# Patient Record
Sex: FEMALE | Race: WHITE | Employment: OTHER | URBAN - METROPOLITAN AREA
[De-identification: names, ages, dates, MRNs, and addresses within clinical notes are randomized per-mention and may not be internally consistent; named-entity substitution may affect disease eponyms.]

---

## 2019-04-12 ENCOUNTER — OFFICE VISIT (OUTPATIENT)
Dept: FAMILY MEDICINE CLINIC | Age: 59
End: 2019-04-12
Payer: COMMERCIAL

## 2019-04-12 ENCOUNTER — HOSPITAL ENCOUNTER (OUTPATIENT)
Age: 59
Discharge: HOME | End: 2019-04-14
Payer: COMMERCIAL

## 2019-04-12 ENCOUNTER — TELEPHONE (OUTPATIENT)
Dept: FAMILY MEDICINE CLINIC | Age: 59
End: 2019-04-12

## 2019-04-12 VITALS
DIASTOLIC BLOOD PRESSURE: 62 MMHG | TEMPERATURE: 98.9 F | WEIGHT: 140 LBS | RESPIRATION RATE: 18 BRPM | OXYGEN SATURATION: 95 % | HEART RATE: 62 BPM | SYSTOLIC BLOOD PRESSURE: 94 MMHG | BODY MASS INDEX: 24.8 KG/M2 | HEIGHT: 63 IN

## 2019-04-12 DIAGNOSIS — Z79.891 CHRONIC PRESCRIPTION OPIATE USE: ICD-10-CM

## 2019-04-12 DIAGNOSIS — F51.01 PRIMARY INSOMNIA: ICD-10-CM

## 2019-04-12 DIAGNOSIS — M79.7 FIBROMYALGIA: Primary | ICD-10-CM

## 2019-04-12 DIAGNOSIS — J40 BRONCHITIS: ICD-10-CM

## 2019-04-12 DIAGNOSIS — M79.7 FIBROMYALGIA: ICD-10-CM

## 2019-04-12 DIAGNOSIS — F17.200 SMOKER: ICD-10-CM

## 2019-04-12 DIAGNOSIS — F41.9 ANXIETY: ICD-10-CM

## 2019-04-12 LAB
AMPHETAMINE SCREEN, URINE: NOT DETECTED
BARBITURATE SCREEN URINE: NOT DETECTED
BASOPHILS ABSOLUTE: 0.03 E9/L (ref 0–0.2)
BASOPHILS RELATIVE PERCENT: 0.3 % (ref 0–2)
BENZODIAZEPINE SCREEN, URINE: NOT DETECTED
CANNABINOID SCREEN URINE: NOT DETECTED
COCAINE METABOLITE SCREEN URINE: NOT DETECTED
EOSINOPHILS ABSOLUTE: 0.09 E9/L (ref 0.05–0.5)
EOSINOPHILS RELATIVE PERCENT: 0.9 % (ref 0–6)
HCT VFR BLD CALC: 42.5 % (ref 34–48)
HEMOGLOBIN: 13.6 G/DL (ref 11.5–15.5)
IMMATURE GRANULOCYTES #: 0.17 E9/L
IMMATURE GRANULOCYTES %: 1.7 % (ref 0–5)
LYMPHOCYTES ABSOLUTE: 2.52 E9/L (ref 1.5–4)
LYMPHOCYTES RELATIVE PERCENT: 25.3 % (ref 20–42)
MCH RBC QN AUTO: 31.6 PG (ref 26–35)
MCHC RBC AUTO-ENTMCNC: 32 % (ref 32–34.5)
MCV RBC AUTO: 98.6 FL (ref 80–99.9)
METHADONE SCREEN, URINE: NOT DETECTED
MONOCYTES ABSOLUTE: 0.7 E9/L (ref 0.1–0.95)
MONOCYTES RELATIVE PERCENT: 7 % (ref 2–12)
NEUTROPHILS ABSOLUTE: 6.45 E9/L (ref 1.8–7.3)
NEUTROPHILS RELATIVE PERCENT: 64.8 % (ref 43–80)
OPIATE SCREEN URINE: NOT DETECTED
PDW BLD-RTO: 13.2 FL (ref 11.5–15)
PHENCYCLIDINE SCREEN URINE: NOT DETECTED
PLATELET # BLD: 421 E9/L (ref 130–450)
PMV BLD AUTO: 9.1 FL (ref 7–12)
PROPOXYPHENE SCREEN: NOT DETECTED
RBC # BLD: 4.31 E12/L (ref 3.5–5.5)
WBC # BLD: 10 E9/L (ref 4.5–11.5)

## 2019-04-12 PROCEDURE — 3017F COLORECTAL CA SCREEN DOC REV: CPT | Performed by: PHYSICIAN ASSISTANT

## 2019-04-12 PROCEDURE — G8427 DOCREV CUR MEDS BY ELIG CLIN: HCPCS | Performed by: PHYSICIAN ASSISTANT

## 2019-04-12 PROCEDURE — 1036F TOBACCO NON-USER: CPT | Performed by: PHYSICIAN ASSISTANT

## 2019-04-12 PROCEDURE — 96372 THER/PROPH/DIAG INJ SC/IM: CPT | Performed by: PHYSICIAN ASSISTANT

## 2019-04-12 PROCEDURE — G8420 CALC BMI NORM PARAMETERS: HCPCS | Performed by: PHYSICIAN ASSISTANT

## 2019-04-12 PROCEDURE — 94640 AIRWAY INHALATION TREATMENT: CPT | Performed by: PHYSICIAN ASSISTANT

## 2019-04-12 PROCEDURE — 99204 OFFICE O/P NEW MOD 45 MIN: CPT | Performed by: PHYSICIAN ASSISTANT

## 2019-04-12 PROCEDURE — 80053 COMPREHEN METABOLIC PANEL: CPT

## 2019-04-12 PROCEDURE — 85025 COMPLETE CBC W/AUTO DIFF WBC: CPT

## 2019-04-12 PROCEDURE — G0480 DRUG TEST DEF 1-7 CLASSES: HCPCS

## 2019-04-12 PROCEDURE — 80307 DRUG TEST PRSMV CHEM ANLYZR: CPT

## 2019-04-12 RX ORDER — HYDROCODONE BITARTRATE AND ACETAMINOPHEN 7.5; 325 MG/1; MG/1
1 TABLET ORAL 3 TIMES DAILY
Refills: 0 | COMMUNITY
Start: 2019-03-12 | End: 2019-04-12 | Stop reason: SDUPTHER

## 2019-04-12 RX ORDER — ZOLPIDEM TARTRATE 10 MG/1
1 TABLET ORAL NIGHTLY
Refills: 0 | COMMUNITY
Start: 2019-04-04 | End: 2019-05-03 | Stop reason: SDUPTHER

## 2019-04-12 RX ORDER — DOXYCYCLINE HYCLATE 100 MG
100 TABLET ORAL 2 TIMES DAILY WITH MEALS
Qty: 20 TABLET | Refills: 0 | Status: SHIPPED | OUTPATIENT
Start: 2019-04-12 | End: 2019-04-12

## 2019-04-12 RX ORDER — ALBUTEROL SULFATE 90 UG/1
2 AEROSOL, METERED RESPIRATORY (INHALATION) EVERY 4 HOURS PRN
Qty: 1 INHALER | Refills: 0 | Status: SHIPPED
Start: 2019-04-12 | End: 2020-04-06 | Stop reason: SDUPTHER

## 2019-04-12 RX ORDER — HYDROCODONE BITARTRATE AND ACETAMINOPHEN 7.5; 325 MG/1; MG/1
1 TABLET ORAL 3 TIMES DAILY
Qty: 21 TABLET | Refills: 0 | Status: SHIPPED | OUTPATIENT
Start: 2019-04-12 | End: 2019-04-18 | Stop reason: SDUPTHER

## 2019-04-12 RX ORDER — ALBUTEROL SULFATE 2.5 MG/3ML
2.5 SOLUTION RESPIRATORY (INHALATION) ONCE
Status: COMPLETED | OUTPATIENT
Start: 2019-04-12 | End: 2019-04-12

## 2019-04-12 RX ORDER — CLONAZEPAM 0.5 MG/1
0.5 TABLET ORAL 2 TIMES DAILY
Qty: 14 TABLET | Refills: 0 | Status: SHIPPED | OUTPATIENT
Start: 2019-04-12 | End: 2019-04-18 | Stop reason: SDUPTHER

## 2019-04-12 RX ORDER — PREDNISONE 20 MG/1
20 TABLET ORAL 3 TIMES DAILY
Qty: 15 TABLET | Refills: 0 | Status: SHIPPED | OUTPATIENT
Start: 2019-04-12 | End: 2019-04-17

## 2019-04-12 RX ORDER — DEXAMETHASONE SODIUM PHOSPHATE 4 MG/ML
4 INJECTION, SOLUTION INTRA-ARTICULAR; INTRALESIONAL; INTRAMUSCULAR; INTRAVENOUS; SOFT TISSUE ONCE
Status: COMPLETED | OUTPATIENT
Start: 2019-04-12 | End: 2019-04-12

## 2019-04-12 RX ORDER — DOXYCYCLINE 100 MG/1
100 CAPSULE ORAL 2 TIMES DAILY
Qty: 20 CAPSULE | Refills: 0 | Status: SHIPPED | OUTPATIENT
Start: 2019-04-12 | End: 2019-08-20 | Stop reason: ALTCHOICE

## 2019-04-12 RX ORDER — CLONAZEPAM 0.5 MG/1
1 TABLET ORAL 2 TIMES DAILY
Refills: 0 | COMMUNITY
Start: 2019-03-03 | End: 2019-04-12 | Stop reason: SDUPTHER

## 2019-04-12 RX ORDER — BENZONATATE 100 MG/1
100 CAPSULE ORAL 2 TIMES DAILY PRN
Qty: 20 CAPSULE | Refills: 0 | Status: SHIPPED | OUTPATIENT
Start: 2019-04-12 | End: 2019-04-19

## 2019-04-12 RX ADMIN — DEXAMETHASONE SODIUM PHOSPHATE 4 MG: 4 INJECTION, SOLUTION INTRA-ARTICULAR; INTRALESIONAL; INTRAMUSCULAR; INTRAVENOUS; SOFT TISSUE at 12:41

## 2019-04-12 RX ADMIN — ALBUTEROL SULFATE 2.5 MG: 2.5 SOLUTION RESPIRATORY (INHALATION) at 12:00

## 2019-04-12 ASSESSMENT — PATIENT HEALTH QUESTIONNAIRE - PHQ9
1. LITTLE INTEREST OR PLEASURE IN DOING THINGS: 0
SUM OF ALL RESPONSES TO PHQ QUESTIONS 1-9: 0
2. FEELING DOWN, DEPRESSED OR HOPELESS: 0
SUM OF ALL RESPONSES TO PHQ9 QUESTIONS 1 & 2: 0
SUM OF ALL RESPONSES TO PHQ QUESTIONS 1-9: 0

## 2019-04-12 NOTE — PROGRESS NOTES
[facility]  [address]   [phone]      Estrella Yarbrough  1960 4/12/19      HPI:  Patient presents for cough and congestion. She was seen in an ED 1 week ago and given tamiflu and prednisone. She stopped the prednisone bc it make her nauseas and she didn't know what they were treating. She is a smoker. She is not sob. She has no fever. She has had fatigue. She has been on opiates for > 20 yrs for fibromyalgia and chronic neck pain. She recalls she has tried gabapentin, lyrica and cymbalta without results. She was sent to pain [facility] and was given morphine which she refused. She has seen neuro surg and refused surgical intervention. This all occurred in [place]. She is currently returning there monthly to get her rx filled. This time they were called in, but the Dr does not plan to do that again. She has been on the BZD in combination for years. She believes \"that's the one that really helps. \"     Past Medical History:   Diagnosis Date    Anxiety     Depression     Hyperlipidemia     not currently. Past Surgical History:   Procedure Laterality Date    TUBAL LIGATION  1989       Current Outpatient Medications   Medication Sig Dispense Refill    zolpidem (AMBIEN) 10 MG tablet Take 1 tablet by mouth nightly. 0    predniSONE (DELTASONE) 20 MG tablet Take 1 tablet by mouth 3 times daily for 5 days 15 tablet 0    benzonatate (TESSALON) 100 MG capsule Take 1 capsule by mouth 2 times daily as needed for Cough 20 capsule 0    albuterol sulfate HFA (VENTOLIN HFA) 108 (90 Base) MCG/ACT inhaler Inhale 2 puffs into the lungs every 4 hours as needed for Wheezing 1 Inhaler 0    clonazePAM (KLONOPIN) 0.5 MG tablet Take 1 tablet by mouth 2 times daily for 7 days. 14 tablet 0    HYDROcodone-acetaminophen (NORCO) 7.5-325 MG per tablet Take 1 tablet by mouth 3 times daily for 7 days.  21 tablet 0    doxycycline monohydrate (MONODOX) 100 MG capsule Take 1 capsule by mouth 2 times daily 20 capsule 0     No current facility-administered medications for this visit.         Allergies   Allergen Reactions    Darvon [Propoxyphene]      DARVOCET    Prozac [Fluoxetine]        Family History   Problem Relation Age of Onset   [facility] Cancer Mother     Dementia Mother     Alcohol Abuse Mother     Diabetes Father     Heart Attack Father     Other Sister         cerebral palsy    No Known Problems Brother     No Known Problems Half-Sister     No Known Problems Son     No Known Problems Son     No Known Problems Son     No Known Problems Son     No Known Problems Daughter        Social History     Socioeconomic History    Marital status:      Spouse name: Not on file    Number of children: Not on file    Years of education: Not on file    Highest education level: Not on file   Occupational History    Not on file   Social Needs    Financial resource strain: Not on file    Food insecurity:     Worry: Not on file     Inability: Not on file    Transportation needs:     Medical: Not on file     Non-medical: Not on file   Tobacco Use    Smoking status: Former Smoker     Last attempt to quit: 2019     Years since quittin.0    Smokeless tobacco: Never Used   Substance and Sexual Activity    Alcohol use: Not on file    Drug use: Not on file    Sexual activity: Not on file   Lifestyle    Physical activity:     Days per week: Not on file     Minutes per session: Not on file    Stress: Not on file   Relationships    Social connections:     Talks on phone: Not on file     Gets together: Not on file     Attends Spiritism service: Not on file     Active member of club or organization: Not on file     Attends meetings of clubs or organizations: Not on file     Relationship status: Not on file    Intimate partner violence:     Fear of current or ex partner: Not on file     Emotionally abused: Not on file     Physically abused: Not on file     Forced sexual activity: Not on file   Other Topics Concern    Not on file   Social History Narrative    Not on file       Review of Systems :  Constitutional: negative for - chills, fever, weight loss, or fatigue  HEENT: negative for - vision changes, nasal congestion, ear pain or pharyngitis   Respiratory: negative for -  Chest heaviness, cough, shortness of breath, or pleuritic pain  Cardiovascular: negative for - diaphoresis, chest pain, palpitations, or edema   Gastrointestinal: negative for -abdominal pain, change in bowel habits, constipation, diarrhea, or nausea/vomiting  Genito-Urinary: negative for - dysuria, frequency, or nocturia  Neurological: negative for - bowel and bladder control changes, dizziness, or headaches   Dermatological: negative for - dry skin, rash, hair or nail symptoms    Physical Exam:   Vitals:    04/12/19 1123   BP: 94/62   Site: Left Upper Arm   Position: Sitting   Cuff Size: Medium Adult   Pulse: 62   Resp: 18   Temp: 98.9 °F (37.2 °C)   TempSrc: Oral   SpO2: 95%   Weight: 140 lb (63.5 kg)   Height: 5' 3\" (1.6 m)     Physical Exam   Constitutional: She is oriented to person, place, and time. She appears well-developed and well-nourished. No distress. HENT:   Head: Normocephalic and atraumatic. Right Ear: External ear normal.   Left Ear: External ear normal.   Nose: Nose normal.   Mouth/Throat: Oropharynx is clear and moist.   Eyes: Pupils are equal, round, and reactive to light. Conjunctivae and EOM are normal. No scleral icterus. Neck: Normal range of motion. Neck supple. No thyromegaly present. Cardiovascular: Normal rate, regular rhythm, normal heart sounds and intact distal pulses. No murmur heard. Pulmonary/Chest: Effort normal. No accessory muscle usage. No respiratory distress. She has wheezes (throughout, not much improvement with neb). Musculoskeletal: Normal range of motion. Neurological: She is alert and oriented to person, place, and time. She has normal reflexes.    Skin: Skin is warm and dry. No rash noted. Psychiatric: She has a normal mood and affect. Her speech is normal and behavior is normal.         Assessment/Plan:     Sabine Martin was seen today for new patient, influenza and cough. Diagnoses and all orders for this visit:    Fibromyalgia  -     HYDROcodone-acetaminophen (NORCO) 7.5-325 MG per tablet; Take 1 tablet by mouth 3 times daily for 7 days.  -     COMPREHENSIVE METABOLIC PANEL; Future  -     CBC Auto Differential; Future    Anxiety  -     clonazePAM (KLONOPIN) 0.5 MG tablet; Take 1 tablet by mouth 2 times daily for 7 days. Bronchitis  -     dexamethasone (DECADRON) injection 4 mg    Primary insomnia    Chronic prescription opiate use  -     URINE DRUG SCREEN; Future  -     OPIATE, QUANTITATIVE, URINE; Future  -     Miscellaneous Sendout 1; Future    Smoker    Other orders  -     predniSONE (DELTASONE) 20 MG tablet; Take 1 tablet by mouth 3 times daily for 5 days  -     Discontinue: doxycycline hyclate (VIBRA-TABS) 100 MG tablet; Take 1 tablet by mouth 2 times daily (with meals) for 10 days  -     benzonatate (TESSALON) 100 MG capsule; Take 1 capsule by mouth 2 times daily as needed for Cough  -     albuterol sulfate HFA (VENTOLIN HFA) 108 (90 Base) MCG/ACT inhaler; Inhale 2 puffs into the lungs every 4 hours as needed for Wheezing  -     albuterol (PROVENTIL) nebulizer solution 2.5 mg  -     doxycycline monohydrate (MONODOX) 100 MG capsule; Take 1 capsule by mouth 2 times daily        Return in about 1 week (around 4/19/2019). I need records justifying chronic opiate use. She doesn't want to go to pain [facility] bc they are \"legal dope dealers\" per her . She has been on meds for 25+ years and will certainly withdrawal. I will send a UDS and review records and decide if we can manage this. Discussed the problems with BZD and opiates. Maybe she would benefit from Neuro Surg referral. She has refused spine surgery in the past. Discussed bronchitis and smoking cessation. Controlled Substances Monitoring:     RX Monitoring 4/12/2019   Attestation The Prescription Monitoring Report for this patient was reviewed today. Chronic Pain Routine Monitoring Random urine drug screen sent today. ;No signs of potential drug abuse or diversion identified: otherwise, see note documentation   Chronic Pain > 50 MEDD Considered consultation with a specialist.   Chronic Pain > 80 MEDD Looked for signs of prescription misuse.            DONIS Villegas

## 2019-04-12 NOTE — TELEPHONE ENCOUNTER
Pharmacist called stating insurance will not pay for Doxycycline Hyclate. Will cover [place] alternatively.  Please call in or send new RX

## 2019-04-13 LAB
ALBUMIN SERPL-MCNC: 4.3 G/DL (ref 3.5–5.2)
ALP BLD-CCNC: 47 U/L (ref 35–104)
ALT SERPL-CCNC: 19 U/L (ref 0–32)
ANION GAP SERPL CALCULATED.3IONS-SCNC: 13 MMOL/L (ref 7–16)
AST SERPL-CCNC: 21 U/L (ref 0–31)
BILIRUB SERPL-MCNC: 0.3 MG/DL (ref 0–1.2)
BUN BLDV-MCNC: 11 MG/DL (ref 6–20)
CALCIUM SERPL-MCNC: 9.6 MG/DL (ref 8.6–10.2)
CHLORIDE BLD-SCNC: 102 MMOL/L (ref 98–107)
CO2: 23 MMOL/L (ref 22–29)
CREAT SERPL-MCNC: 0.8 MG/DL (ref 0.5–1)
GFR AFRICAN AMERICAN: >60
GFR NON-AFRICAN AMERICAN: >60 ML/MIN/1.73
GLUCOSE BLD-MCNC: 74 MG/DL (ref 74–99)
POTASSIUM SERPL-SCNC: 4.8 MMOL/L (ref 3.5–5)
SODIUM BLD-SCNC: 138 MMOL/L (ref 132–146)
TOTAL PROTEIN: 7.6 G/DL (ref 6.4–8.3)

## 2019-04-17 LAB
7-AMINOCLONAZEPAM, URINE: 29 NG/ML
ALPHA-HYDROXYALPRAZOLAM, URINE: <5 NG/ML
ALPHA-HYDROXYMIDAZOLAM, URINE: <20 NG/ML
ALPRAZOLAM, URINE: <5 NG/ML
CHLORDIAZEPOXIDE, URINE: <20 NG/ML
CLONAZEPAM, URINE: <5 NG/ML
DIAZEPAM, URINE: <20 NG/ML
LORAZEPAM, URINE: <20 NG/ML
MIDAZOLAM, URINE: <20 NG/ML
NORDIAZEPAM, URINE: <20 NG/ML
OXAZEPAM, URINE: <20 NG/ML
TEMAZEPAM, URINE: <20 NG/ML

## 2019-04-18 ENCOUNTER — OFFICE VISIT (OUTPATIENT)
Dept: FAMILY MEDICINE CLINIC | Age: 59
End: 2019-04-18
Payer: COMMERCIAL

## 2019-04-18 VITALS
SYSTOLIC BLOOD PRESSURE: 112 MMHG | DIASTOLIC BLOOD PRESSURE: 76 MMHG | OXYGEN SATURATION: 99 % | TEMPERATURE: 97.8 F | BODY MASS INDEX: 25.34 KG/M2 | HEART RATE: 84 BPM | WEIGHT: 143 LBS | RESPIRATION RATE: 18 BRPM | HEIGHT: 63 IN

## 2019-04-18 DIAGNOSIS — Z79.891 CHRONIC PRESCRIPTION OPIATE USE: Primary | ICD-10-CM

## 2019-04-18 DIAGNOSIS — F41.9 ANXIETY: ICD-10-CM

## 2019-04-18 DIAGNOSIS — J40 BRONCHITIS: ICD-10-CM

## 2019-04-18 DIAGNOSIS — M79.7 FIBROMYALGIA: ICD-10-CM

## 2019-04-18 LAB
6AM URINE: <10 NG/ML
CODEINE, URINE: <20 NG/ML
HYDROCODONE, URINE: 308 NG/ML
HYDROMORPHONE, URINE: <20 NG/ML
MORPHINE URINE: <20 NG/ML
NORHYDROCODONE, URINE: 542 NG/ML
NOROXYCODONE, URINE: <20 NG/ML
NOROXYMORPHONE, URINE: <20 NG/ML
OXYCODONE, URINE CONFIRMATION: <20 NG/ML
OXYMORPHONE, URINE: <20 NG/ML

## 2019-04-18 PROCEDURE — 1036F TOBACCO NON-USER: CPT | Performed by: PHYSICIAN ASSISTANT

## 2019-04-18 PROCEDURE — 3017F COLORECTAL CA SCREEN DOC REV: CPT | Performed by: PHYSICIAN ASSISTANT

## 2019-04-18 PROCEDURE — 99214 OFFICE O/P EST MOD 30 MIN: CPT | Performed by: PHYSICIAN ASSISTANT

## 2019-04-18 PROCEDURE — G8427 DOCREV CUR MEDS BY ELIG CLIN: HCPCS | Performed by: PHYSICIAN ASSISTANT

## 2019-04-18 PROCEDURE — G8419 CALC BMI OUT NRM PARAM NOF/U: HCPCS | Performed by: PHYSICIAN ASSISTANT

## 2019-04-18 RX ORDER — HYDROCODONE BITARTRATE AND ACETAMINOPHEN 7.5; 325 MG/1; MG/1
1 TABLET ORAL 3 TIMES DAILY
Qty: 90 TABLET | Refills: 0 | Status: SHIPPED | OUTPATIENT
Start: 2019-04-18 | End: 2019-05-03 | Stop reason: SDUPTHER

## 2019-04-18 RX ORDER — CLONAZEPAM 0.5 MG/1
0.5 TABLET ORAL 2 TIMES DAILY
Qty: 60 TABLET | Refills: 0 | Status: SHIPPED | OUTPATIENT
Start: 2019-04-18 | End: 2019-05-03 | Stop reason: SDUPTHER

## 2019-04-18 NOTE — PROGRESS NOTES
[facility]  [address]   [phone]      Bonnita Nyhan  1960 4/18/19      HPI:  Patient presents for f/u for bronchitis. Her sx have improved dramatically. She completed the prednisone, and continues the Doxy. She is using the albuterol as directed. She states she is slightly \"jittery\" but assumed it is medication related. She brings in her record she has at home with her chronic dx from [facility] PCP. It states fibromyalgia and cervical disc disease. We still do not have a copy of the mri. I noticed she was given a rx for naloxone inj. She states it was actually given bc her daughter OD on heroin a year ago, therefore her PCP wanted her to have it. She states it was never filled bc she didn't want to have it. We reviewed her uds which was appropriate. Past Medical History:   Diagnosis Date    Anxiety     Depression     Hyperlipidemia     not currently. Past Surgical History:   Procedure Laterality Date    TUBAL LIGATION  1989       Current Outpatient Medications   Medication Sig Dispense Refill    clonazePAM (KLONOPIN) 0.5 MG tablet Take 1 tablet by mouth 2 times daily for 7 days. 60 tablet 0    HYDROcodone-acetaminophen (NORCO) 7.5-325 MG per tablet Take 1 tablet by mouth 3 times daily for 30 days. 90 tablet 0    zolpidem (AMBIEN) 10 MG tablet Take 1 tablet by mouth nightly. 0    benzonatate (TESSALON) 100 MG capsule Take 1 capsule by mouth 2 times daily as needed for Cough 20 capsule 0    albuterol sulfate HFA (VENTOLIN HFA) 108 (90 Base) MCG/ACT inhaler Inhale 2 puffs into the lungs every 4 hours as needed for Wheezing 1 Inhaler 0    doxycycline monohydrate (MONODOX) 100 MG capsule Take 1 capsule by mouth 2 times daily 20 capsule 0     No current facility-administered medications for this visit.         Allergies   Allergen Reactions    Darvon [Propoxyphene]      DARVOCET    Prozac [Fluoxetine]        Family History   Problem Relation Age of Onset    Cancer Mother     Dementia Mother     Alcohol Abuse Mother     Diabetes Father     Heart Attack Father     Other Sister         cerebral palsy    No Known Problems Brother     No Known Problems Half-Sister     No Known Problems Son     No Known Problems Son     No Known Problems Son     No Known Problems Son     No Known Problems Daughter        Social History     Socioeconomic History    Marital status:      Spouse name: Not on file    Number of children: Not on file    Years of education: Not on file    Highest education level: Not on file   Occupational History    Not on file   Social Needs    Financial resource strain: Not on file    Food insecurity:     Worry: Not on file     Inability: Not on file    Transportation needs:     Medical: Not on file     Non-medical: Not on file   Tobacco Use    Smoking status: Former Smoker     Last attempt to quit: 2019     Years since quittin.0    Smokeless tobacco: Never Used   Substance and Sexual Activity    Alcohol use: Not on file    Drug use: Not on file    Sexual activity: Not on file   Lifestyle    Physical activity:     Days per week: Not on file     Minutes per session: Not on file    Stress: Not on file   Relationships    Social connections:     Talks on phone: Not on file     Gets together: Not on file     Attends Islam service: Not on file     Active member of club or organization: Not on file     Attends meetings of clubs or organizations: Not on file     Relationship status: Not on file    Intimate partner violence:     Fear of current or ex partner: Not on file     Emotionally abused: Not on file     Physically abused: Not on file     Forced sexual activity: Not on file   Other Topics Concern    Not on file   Social History Narrative    Not on file       Review of Systems :  Constitutional: negative for - chills, fever, weight loss, or fatigue  Psychological: negative for - +anxiety, depression or visit:    Chronic prescription opiate use  -     ([facility]) - Demetris Resendez MD, NeurosurgeryErik ([place])    Fibromyalgia  -     HYDROcodone-acetaminophen (NORCO) 7.5-325 MG per tablet; Take 1 tablet by mouth 3 times daily for 30 days.  -     ([facility]) - Demetris eRsendez MD, Neurosurgery, Erik ([place]IL[place])    Bronchitis    Anxiety  -     clonazePAM (KLONOPIN) 0.5 MG tablet; Take 1 tablet by mouth 2 times daily for 7 days. Her uds was appropriate, as was her OArrs report. I understand that she has been on these meds chronically, but being that I have no medical records, and that I hesitate to continue this based on the dx of fibromyalgia, I will refer to pain mgnt for at least an opinion. Also I have concerns about the combination with the BZD. Patient will certainly withdraw without these meds, therefore I will provide an rx for 4 weeks, and will personally call Dr Farmer's office to express the urgency of the referral. Bronchitis is improved. She is not smoking. Controlled Substances Monitoring:     RX Monitoring 4/18/2019   Attestation The Prescription Monitoring Report for this patient was reviewed today. Acute Pain Prescriptions Severe pain not adequately treated with lower dose. Chronic Pain Routine Monitoring No signs of potential drug abuse or diversion identified: otherwise, see note documentation   Chronic Pain > 50 MEDD Considered consultation with a specialist.   Chronic Pain > 80 MEDD Looked for signs of prescription misuse. Chronic Pain > 120 MEDD Engaged a pain medicine specialist as a prescriber or consultant.          DONIS Yu

## 2019-07-02 ENCOUNTER — TELEPHONE (OUTPATIENT)
Dept: FAMILY MEDICINE CLINIC | Age: 59
End: 2019-07-02

## 2019-08-20 ENCOUNTER — OFFICE VISIT (OUTPATIENT)
Dept: FAMILY MEDICINE CLINIC | Age: 59
End: 2019-08-20
Payer: COMMERCIAL

## 2019-08-20 VITALS
RESPIRATION RATE: 18 BRPM | BODY MASS INDEX: 26.58 KG/M2 | WEIGHT: 150 LBS | OXYGEN SATURATION: 97 % | SYSTOLIC BLOOD PRESSURE: 104 MMHG | DIASTOLIC BLOOD PRESSURE: 60 MMHG | HEART RATE: 96 BPM | HEIGHT: 63 IN | TEMPERATURE: 98.9 F

## 2019-08-20 DIAGNOSIS — M79.7 FIBROMYALGIA: Primary | ICD-10-CM

## 2019-08-20 DIAGNOSIS — M25.551 RIGHT HIP PAIN: ICD-10-CM

## 2019-08-20 DIAGNOSIS — Z12.11 COLON CANCER SCREENING: ICD-10-CM

## 2019-08-20 DIAGNOSIS — Z12.39 BREAST CANCER SCREENING: ICD-10-CM

## 2019-08-20 PROCEDURE — 96372 THER/PROPH/DIAG INJ SC/IM: CPT | Performed by: PHYSICIAN ASSISTANT

## 2019-08-20 PROCEDURE — 3017F COLORECTAL CA SCREEN DOC REV: CPT | Performed by: PHYSICIAN ASSISTANT

## 2019-08-20 PROCEDURE — 99213 OFFICE O/P EST LOW 20 MIN: CPT | Performed by: PHYSICIAN ASSISTANT

## 2019-08-20 PROCEDURE — G8427 DOCREV CUR MEDS BY ELIG CLIN: HCPCS | Performed by: PHYSICIAN ASSISTANT

## 2019-08-20 PROCEDURE — G8419 CALC BMI OUT NRM PARAM NOF/U: HCPCS | Performed by: PHYSICIAN ASSISTANT

## 2019-08-20 PROCEDURE — 1036F TOBACCO NON-USER: CPT | Performed by: PHYSICIAN ASSISTANT

## 2019-08-20 RX ORDER — DEXAMETHASONE SODIUM PHOSPHATE 4 MG/ML
4 INJECTION, SOLUTION INTRA-ARTICULAR; INTRALESIONAL; INTRAMUSCULAR; INTRAVENOUS; SOFT TISSUE ONCE
Status: COMPLETED | OUTPATIENT
Start: 2019-08-20 | End: 2019-08-20

## 2019-08-20 RX ORDER — METHYLPREDNISOLONE 4 MG/1
TABLET ORAL
Qty: 1 KIT | Refills: 0 | Status: SHIPPED | OUTPATIENT
Start: 2019-08-20 | End: 2019-08-26

## 2019-08-20 RX ADMIN — DEXAMETHASONE SODIUM PHOSPHATE 4 MG: 4 INJECTION, SOLUTION INTRA-ARTICULAR; INTRALESIONAL; INTRAMUSCULAR; INTRAVENOUS; SOFT TISSUE at 12:34

## 2019-08-20 ASSESSMENT — PATIENT HEALTH QUESTIONNAIRE - PHQ9
SUM OF ALL RESPONSES TO PHQ9 QUESTIONS 1 & 2: 0
SUM OF ALL RESPONSES TO PHQ QUESTIONS 1-9: 0
1. LITTLE INTEREST OR PLEASURE IN DOING THINGS: 0
2. FEELING DOWN, DEPRESSED OR HOPELESS: 0
SUM OF ALL RESPONSES TO PHQ QUESTIONS 1-9: 0

## 2019-08-20 NOTE — PROGRESS NOTES
Relation Age of Onset    Cancer Mother     Dementia Mother     Alcohol Abuse Mother     Diabetes Father     Heart Attack Father     Other Sister         cerebral palsy    No Known Problems Brother     No Known Problems Half-Sister     No Known Problems Son     No Known Problems Son     No Known Problems Son     No Known Problems Son     No Known Problems Daughter        Social History     Socioeconomic History    Marital status:      Spouse name: Not on file    Number of children: Not on file    Years of education: Not on file    Highest education level: Not on file   Occupational History    Not on file   Social Needs    Financial resource strain: Not on file    Food insecurity:     Worry: Not on file     Inability: Not on file    Transportation needs:     Medical: Not on file     Non-medical: Not on file   Tobacco Use    Smoking status: Former Smoker     Packs/day: 0.50     Years: 35.00     Pack years: 17.50     Types: Cigarettes     Start date: 1975     Last attempt to quit: 2019     Years since quittin.3    Smokeless tobacco: Never Used   Substance and Sexual Activity    Alcohol use: Not on file    Drug use: Not on file    Sexual activity: Not on file   Lifestyle    Physical activity:     Days per week: Not on file     Minutes per session: Not on file    Stress: Not on file   Relationships    Social connections:     Talks on phone: Not on file     Gets together: Not on file     Attends Mormon service: Not on file     Active member of club or organization: Not on file     Attends meetings of clubs or organizations: Not on file     Relationship status: Not on file    Intimate partner violence:     Fear of current or ex partner: Not on file     Emotionally abused: Not on file     Physically abused: Not on file     Forced sexual activity: Not on file   Other Topics Concern    Not on file   Social History Narrative    Not on file       Review of Systems behavior is normal.       Assessment/Plan:     Gardner Leyden was seen today for hip pain. Diagnoses and all orders for this visit:    Fibromyalgia    Right hip pain  -     methylPREDNISolone (MEDROL, ADAM,) 4 MG tablet; Take as directed  -     dexamethasone (DECADRON) injection 4 mg    Breast cancer screening  -     LYDIA DIGITAL SCREEN W CAD BILATERAL; Future    Colon cancer screening  -     POCT Fit Test; Future        Return in about 4 weeks (around 9/17/2019). consider imaging if sx fail to resolve. Schedule pap and mammogram. Will try to get info from  about her colonoscopy.      DONIS Watkins

## 2019-10-14 ENCOUNTER — HOSPITAL ENCOUNTER (OUTPATIENT)
Age: 59
Discharge: HOME | End: 2019-10-16
Payer: COMMERCIAL

## 2019-10-14 ENCOUNTER — OFFICE VISIT (OUTPATIENT)
Dept: FAMILY MEDICINE CLINIC | Age: 59
End: 2019-10-14
Payer: COMMERCIAL

## 2019-10-14 VITALS
SYSTOLIC BLOOD PRESSURE: 105 MMHG | OXYGEN SATURATION: 95 % | TEMPERATURE: 99.3 F | HEIGHT: 64 IN | DIASTOLIC BLOOD PRESSURE: 69 MMHG | BODY MASS INDEX: 25.95 KG/M2 | HEART RATE: 75 BPM | WEIGHT: 152 LBS | RESPIRATION RATE: 16 BRPM

## 2019-10-14 DIAGNOSIS — Z12.11 COLON CANCER SCREENING: ICD-10-CM

## 2019-10-14 DIAGNOSIS — Z12.39 BREAST CANCER SCREENING: ICD-10-CM

## 2019-10-14 DIAGNOSIS — F17.200 SMOKER: ICD-10-CM

## 2019-10-14 DIAGNOSIS — Z23 NEED FOR STREPTOCOCCUS PNEUMONIAE AND INFLUENZA VACCINATION: ICD-10-CM

## 2019-10-14 DIAGNOSIS — Z12.4 CERVICAL CANCER SCREENING: Primary | ICD-10-CM

## 2019-10-14 PROCEDURE — G8482 FLU IMMUNIZE ORDER/ADMIN: HCPCS | Performed by: PHYSICIAN ASSISTANT

## 2019-10-14 PROCEDURE — 90472 IMMUNIZATION ADMIN EACH ADD: CPT | Performed by: PHYSICIAN ASSISTANT

## 2019-10-14 PROCEDURE — 90732 PPSV23 VACC 2 YRS+ SUBQ/IM: CPT | Performed by: PHYSICIAN ASSISTANT

## 2019-10-14 PROCEDURE — 90688 IIV4 VACCINE SPLT 0.5 ML IM: CPT | Performed by: PHYSICIAN ASSISTANT

## 2019-10-14 PROCEDURE — 88175 CYTOPATH C/V AUTO FLUID REDO: CPT

## 2019-10-14 PROCEDURE — 90471 IMMUNIZATION ADMIN: CPT | Performed by: PHYSICIAN ASSISTANT

## 2019-10-14 PROCEDURE — 99396 PREV VISIT EST AGE 40-64: CPT | Performed by: PHYSICIAN ASSISTANT

## 2019-10-14 RX ORDER — CLONAZEPAM 0.5 MG/1
0.5 TABLET ORAL 2 TIMES DAILY PRN
Refills: 0 | COMMUNITY
Start: 2019-09-16 | End: 2020-03-30 | Stop reason: SDUPTHER

## 2020-01-21 ENCOUNTER — OFFICE VISIT (OUTPATIENT)
Dept: FAMILY MEDICINE CLINIC | Age: 60
End: 2020-01-21
Payer: COMMERCIAL

## 2020-01-21 VITALS
HEART RATE: 73 BPM | TEMPERATURE: 98.3 F | WEIGHT: 155 LBS | OXYGEN SATURATION: 96 % | DIASTOLIC BLOOD PRESSURE: 62 MMHG | BODY MASS INDEX: 26.46 KG/M2 | HEIGHT: 64 IN | SYSTOLIC BLOOD PRESSURE: 98 MMHG | RESPIRATION RATE: 18 BRPM

## 2020-01-21 LAB
CONTROL: PRESENT
HEMOCCULT STL QL: NEGATIVE

## 2020-01-21 PROCEDURE — 82274 ASSAY TEST FOR BLOOD FECAL: CPT | Performed by: PHYSICIAN ASSISTANT

## 2020-01-21 PROCEDURE — 99213 OFFICE O/P EST LOW 20 MIN: CPT | Performed by: PHYSICIAN ASSISTANT

## 2020-01-21 ASSESSMENT — PATIENT HEALTH QUESTIONNAIRE - PHQ9
SUM OF ALL RESPONSES TO PHQ QUESTIONS 1-9: 0
SUM OF ALL RESPONSES TO PHQ QUESTIONS 1-9: 0
2. FEELING DOWN, DEPRESSED OR HOPELESS: 0
1. LITTLE INTEREST OR PLEASURE IN DOING THINGS: 0
SUM OF ALL RESPONSES TO PHQ9 QUESTIONS 1 & 2: 0

## 2020-01-21 NOTE — PROGRESS NOTES
[facility]  [address]   [phone]      Reshma Quintero  1960 1/21/20      HPI:  Patient presents for lesion on heel causing her pain when she walks. Sx present for 1 week. It is causing her to walk on her toes, thus causing leg pain. She also has a lesion on left arm, that continuously has scabbed and then was scrapped off for about 3 months. This is not painful. She has no similar lesions. Past Medical History:   Diagnosis Date    Anxiety     Depression     Hyperlipidemia     not currently. Past Surgical History:   Procedure Laterality Date    TUBAL LIGATION  1989       Current Outpatient Medications   Medication Sig Dispense Refill    mineral oil-hydrophilic petrolatum (AQUAPHOR) ointment Apply topically as needed. 1 g 0    Wound Dressings (ALLEVYN GENTLE BORDER HEEL) PADS Apply 1 Device topically daily 10 each 1    gabapentin (NEURONTIN) 400 MG capsule Take 1 capsule by mouth 4 times daily for 30 days. 120 capsule 2    [START ON 2/28/2020] HYDROcodone-acetaminophen (NORCO) 5-325 MG per tablet Take 1 tablet by mouth every 8 hours as needed for Pain for up to 30 days. Intended supply: 3 days. Take lowest dose possible to manage pain 90 tablet 0    clonazePAM (KLONOPIN) 0.5 MG tablet Take 0.5 mg by mouth 2 times daily as needed. 0    albuterol sulfate HFA (VENTOLIN HFA) 108 (90 Base) MCG/ACT inhaler Inhale 2 puffs into the lungs every 4 hours as needed for Wheezing 1 Inhaler 0    clonazePAM (KLONOPIN) 0.5 MG tablet Take 1 tablet by mouth 2 times daily for 7 days. 60 tablet 2     No current facility-administered medications for this visit.         Allergies   Allergen Reactions    Darvon [Propoxyphene]      DARVOCET    Prozac [Fluoxetine]        Family History   Problem Relation Age of Onset   [facility] Cancer Mother     Dementia Mother     Alcohol Abuse Mother     Diabetes Father     Heart Attack Father     Other Sister         cerebral changes, nasal congestion, ear pain or pharyngitis   Respiratory: negative for -  Chest heaviness, cough, shortness of breath, or pleuritic pain  Cardiovascular: negative for - diaphoresis, chest pain, palpitations, or edema   Gastrointestinal: negative for -abdominal pain, change in bowel habits, constipation, diarrhea, or nausea/vomiting  Genito-Urinary: negative for - dysuria, frequency, or nocturia  Musculoskeletal: negative for - gait disturbance, joint pain, muscle pain or weakness  Neurological: negative for - bowel and bladder control changes, dizziness, or headaches     Physical Exam:   Vitals:    01/21/20 1019   BP: 98/62   Site: Left Upper Arm   Position: Sitting   Cuff Size: Large Adult   Pulse: 73   Resp: 18   Temp: 98.3 °F (36.8 °C)   TempSrc: Oral   SpO2: 96%   Weight: 155 lb (70.3 kg)   Height: 5' 4\" (1.626 m)     Physical Exam  Constitutional:       General: She is not in acute distress. Appearance: She is well-developed. HENT:      Head: Normocephalic and atraumatic. Right Ear: External ear normal.      Left Ear: External ear normal.      Nose: Nose normal.   Eyes:      General: No scleral icterus. Conjunctiva/sclera: Conjunctivae normal.      Pupils: Pupils are equal, round, and reactive to light. Neck:      Musculoskeletal: Normal range of motion and neck supple. Thyroid: No thyromegaly. Cardiovascular:      Rate and Rhythm: Normal rate and regular rhythm. Heart sounds: Normal heart sounds. No murmur. Pulmonary:      Effort: Pulmonary effort is normal. No accessory muscle usage or respiratory distress. Breath sounds: Normal breath sounds. No wheezing. Musculoskeletal: Normal range of motion. Skin:     General: Skin is warm and dry. Findings: Lesion (post forearm scabbed lesion) present. No rash. Comments: Callous of heel   Neurological:      Mental Status: She is alert and oriented to person, place, and time.       Deep Tendon Reflexes: Reflexes are normal and symmetric. Psychiatric:         Speech: Speech normal.         Behavior: Behavior normal.           Assessment/Plan:     Minh Alfaro was seen today for foot pain. Diagnoses and all orders for this visit:    Callus of heel    Lesion of left median nerve at upper arm    Encounter for screening mammogram for breast cancer  -     [facility] Digital Screen Bilateral [[id number]]; Future    Colon cancer screening  -     Cancel: Cologuard (For External Results Only); Future  -     POCT Fit Test; Future    Other orders  -     mineral oil-hydrophilic petrolatum (AQUAPHOR) ointment; Apply topically as needed. -     Wound Dressings (ALLEVYN GENTLE BORDER HEEL) PADS; Apply 1 Device topically daily        F/u in 2 week if lesion on arm does not heal. Will need shave biopsy.      DONIS Briones

## 2020-01-21 NOTE — TELEPHONE ENCOUNTER
Pharmacy called stated that the gentle border heel is not something they covered and or that they couldn't order something like this for the patient please advise

## 2020-03-04 ENCOUNTER — OFFICE VISIT (OUTPATIENT)
Dept: FAMILY MEDICINE CLINIC | Age: 60
End: 2020-03-04
Payer: COMMERCIAL

## 2020-03-04 ENCOUNTER — TELEPHONE (OUTPATIENT)
Dept: ADMINISTRATIVE | Age: 60
End: 2020-03-04

## 2020-03-04 ENCOUNTER — NURSE TRIAGE (OUTPATIENT)
Dept: OTHER | Facility: CLINIC | Age: 60
End: 2020-03-04

## 2020-03-04 VITALS
HEIGHT: 63 IN | OXYGEN SATURATION: 97 % | TEMPERATURE: 97.6 F | BODY MASS INDEX: 27.46 KG/M2 | SYSTOLIC BLOOD PRESSURE: 122 MMHG | DIASTOLIC BLOOD PRESSURE: 80 MMHG | RESPIRATION RATE: 18 BRPM | HEART RATE: 65 BPM | WEIGHT: 155 LBS

## 2020-03-04 PROCEDURE — 90715 TDAP VACCINE 7 YRS/> IM: CPT | Performed by: PHYSICIAN ASSISTANT

## 2020-03-04 PROCEDURE — 90471 IMMUNIZATION ADMIN: CPT | Performed by: PHYSICIAN ASSISTANT

## 2020-03-04 PROCEDURE — S8451 SPLINT WRIST OR ANKLE: HCPCS | Performed by: PHYSICIAN ASSISTANT

## 2020-03-04 PROCEDURE — 99214 OFFICE O/P EST MOD 30 MIN: CPT | Performed by: PHYSICIAN ASSISTANT

## 2020-03-04 SDOH — HEALTH STABILITY: MENTAL HEALTH: HOW OFTEN DO YOU HAVE A DRINK CONTAINING ALCOHOL?: NEVER

## 2020-03-04 NOTE — PROGRESS NOTES
3/4/20  Tavon Burciaga : 1960 Sex: female  Age 61 y.o. Subjective:  Chief Complaint   Patient presents with    Fall     pt states she fell last night down about 8 steps, right hand pain, left back pain          HPI:   Tavon Burciaga , 61 y.o. female presents to [facility] care for evaluation of fell down steps last night. The patient states that she slipped and fell down about 8 steps last night. The patient landed mostly on her right side. The patient is complaining of right wrist pain. The patient is also having some left low back pain. The patient also has a small superficial abrasion noted to her anterior left knee. The patient is also having some right mid thigh pain. The patient denies head injury. No loss of consciousness. The patient does not take any anticoagulants. No history of bleeding disorders. The patient did not have any bladder or bowel incontinence, urinary retention or saddle anesthesia. She is able to ambulate. ROS:   Unless otherwise stated in this report the patient's positive and negative responses for review of systems for constitutional, eyes, ENT, cardiovascular, respiratory, gastrointestinal, neurological, , musculoskeletal, and integument systems and related systems to the presenting problem are either stated in the history of present illness or were not pertinent or were negative for the symptoms and/or complaints related to the presenting medical problem. Positives and pertinent negatives as per HPI. All others reviewed and are negative. PMH:     Past Medical History:   Diagnosis Date    Anxiety     Depression     Hyperlipidemia     not currently.        Past Surgical History:   Procedure Laterality Date    TUBAL LIGATION         Family History   Problem Relation Age of Onset   Skylar Addison Cancer Mother     Dementia Mother     Alcohol Abuse Mother     Diabetes Father     Heart Attack Father     Other Sister         cerebral palsy    No Known radiopaque foreign body noted. No acute findings. Medical Decision Making:     The patient does not appear to be toxic or lethargic. The patient does appear well. Vital signs reviewed and noted to be within normal limits. The patient will be sent for x-rays of the right wrist, the left knee, the right femur, and the lumbar spine. Patient will be reassessed and reevaluated after the imaging. She will also require a Tdap and we will provide that here for her. The wound to the left knee will be cleaned and irrigated as well. The patient had bacitracin applied and a nonadherent dressing. The patient was neurovascular intact. I reviewed the x-rays of the left knee and did not see any evidence of acute fracture dislocation. I reviewed the x-ray of the right femur and did not see any evidence of acute fracture or dislocation. I personally reviewed the x-rays of the lumbar spine did show multiple degenerative changes but no evidence of acute process. I personally reviewed the images of the right wrist and did not see any evidence of acute fracture or dislocation. The patient will be placed in a Velcro wrist splint. We will have the patient follow-up with PCP. The patient is neurovascular intact. The patient does not appear to be toxic or lethargic. The patient does appear well. The patient will follow-up with PCP. We discussed the potential of an occult fracture and the need for follow-up. The patient will use Motrin, Tylenol, ice to the affected areas. Clinical Impression:   Pedro Tyler was seen today for fall. Diagnoses and all orders for this visit:    Fall, initial encounter  -     XR WRIST RIGHT (MIN 3 VIEWS); Future  -     XR LUMBAR SPINE (2-3 VIEWS); Future  -     Cancel: XR KNEE LEFT (MIN 4 VIEWS); Future  -     XR FEMUR RIGHT (MIN 2 VIEWS);  Future    Right wrist pain    Acute left-sided low back pain without sciatica    Acute pain of left knee    Abrasion, left

## 2020-03-04 NOTE — TELEPHONE ENCOUNTER
Reason for Disposition   Patient wants to be seen    Protocols used: HAND AND WRIST INJURY-ADULT-[place]    PT states she tripped up the steps then fell backwards down the steps. States she fell approximately 6 steps. She states she has a small cut on her left leg, a brusie on her right wrist and her back has a scratch on it. Pt given disposition recommendation and soft transferred to scheduling.

## 2020-03-04 NOTE — TELEPHONE ENCOUNTER
No appt avail pt  Advised to go to walk in care n. [id number] [facility].  Office at lunch , pcp off, Dr Bela Ivory full

## 2020-04-06 RX ORDER — ALBUTEROL SULFATE 90 UG/1
2 AEROSOL, METERED RESPIRATORY (INHALATION) EVERY 4 HOURS PRN
Qty: 1 INHALER | Refills: 0 | Status: SHIPPED | OUTPATIENT
Start: 2020-04-06

## 2020-10-16 ENCOUNTER — NURSE ONLY (OUTPATIENT)
Dept: FAMILY MEDICINE CLINIC | Age: 60
End: 2020-10-16

## 2020-10-16 PROCEDURE — 90471 IMMUNIZATION ADMIN: CPT | Performed by: PHYSICIAN ASSISTANT

## 2020-10-16 PROCEDURE — 90686 IIV4 VACC NO PRSV 0.5 ML IM: CPT | Performed by: PHYSICIAN ASSISTANT

## 2021-03-12 ENCOUNTER — IMMUNIZATION (OUTPATIENT)
Dept: PRIMARY CARE CLINIC | Age: 61
End: 2021-03-12
Payer: COMMERCIAL

## 2021-03-12 PROCEDURE — 91300 COVID-19, PFIZER VACCINE 30MCG/0.3ML DOSE: CPT | Performed by: PHYSICIAN ASSISTANT

## 2021-03-12 PROCEDURE — 0001A COVID-19, PFIZER VACCINE 30MCG/0.3ML DOSE: CPT | Performed by: PHYSICIAN ASSISTANT

## 2021-04-14 ENCOUNTER — IMMUNIZATION (OUTPATIENT)
Dept: PRIMARY CARE CLINIC | Age: 61
End: 2021-04-14
Payer: COMMERCIAL

## 2021-04-14 PROCEDURE — 0002A COVID-19, PFIZER VACCINE 30MCG/0.3ML DOSE: CPT

## 2021-04-14 PROCEDURE — 91300 COVID-19, PFIZER VACCINE 30MCG/0.3ML DOSE: CPT

## 2021-06-28 ENCOUNTER — OFFICE VISIT (OUTPATIENT)
Dept: FAMILY MEDICINE CLINIC | Age: 61
End: 2021-06-28
Payer: COMMERCIAL

## 2021-06-28 VITALS
DIASTOLIC BLOOD PRESSURE: 84 MMHG | TEMPERATURE: 97.4 F | WEIGHT: 155 LBS | BODY MASS INDEX: 27.46 KG/M2 | RESPIRATION RATE: 14 BRPM | HEART RATE: 78 BPM | OXYGEN SATURATION: 96 % | SYSTOLIC BLOOD PRESSURE: 130 MMHG | HEIGHT: 63 IN

## 2021-06-28 DIAGNOSIS — F41.9 ANXIETY: ICD-10-CM

## 2021-06-28 DIAGNOSIS — F17.200 SMOKER: ICD-10-CM

## 2021-06-28 DIAGNOSIS — Z12.31 ENCOUNTER FOR SCREENING MAMMOGRAM FOR MALIGNANT NEOPLASM OF BREAST: ICD-10-CM

## 2021-06-28 DIAGNOSIS — Z12.11 COLON CANCER SCREENING: ICD-10-CM

## 2021-06-28 DIAGNOSIS — M25.512 ACUTE PAIN OF LEFT SHOULDER: Primary | ICD-10-CM

## 2021-06-28 DIAGNOSIS — Z79.891 CHRONIC PRESCRIPTION OPIATE USE: ICD-10-CM

## 2021-06-28 DIAGNOSIS — Z13.220 LIPID SCREENING: ICD-10-CM

## 2021-06-28 DIAGNOSIS — M79.7 FIBROMYALGIA: ICD-10-CM

## 2021-06-28 LAB
BASOPHILS ABSOLUTE: 0.06 E9/L (ref 0–0.2)
BASOPHILS RELATIVE PERCENT: 0.9 % (ref 0–2)
EOSINOPHILS ABSOLUTE: 0.1 E9/L (ref 0.05–0.5)
EOSINOPHILS RELATIVE PERCENT: 1.6 % (ref 0–6)
HCT VFR BLD CALC: 41.7 % (ref 34–48)
HEMOGLOBIN: 13.3 G/DL (ref 11.5–15.5)
IMMATURE GRANULOCYTES #: 0.02 E9/L
IMMATURE GRANULOCYTES %: 0.3 % (ref 0–5)
LYMPHOCYTES ABSOLUTE: 2.15 E9/L (ref 1.5–4)
LYMPHOCYTES RELATIVE PERCENT: 33.5 % (ref 20–42)
MCH RBC QN AUTO: 31.1 PG (ref 26–35)
MCHC RBC AUTO-ENTMCNC: 31.9 % (ref 32–34.5)
MCV RBC AUTO: 97.7 FL (ref 80–99.9)
MONOCYTES ABSOLUTE: 0.46 E9/L (ref 0.1–0.95)
MONOCYTES RELATIVE PERCENT: 7.2 % (ref 2–12)
NEUTROPHILS ABSOLUTE: 3.62 E9/L (ref 1.8–7.3)
NEUTROPHILS RELATIVE PERCENT: 56.5 % (ref 43–80)
PDW BLD-RTO: 13.2 FL (ref 11.5–15)
PLATELET # BLD: 257 E9/L (ref 130–450)
PMV BLD AUTO: 8.8 FL (ref 7–12)
RBC # BLD: 4.27 E12/L (ref 3.5–5.5)
WBC # BLD: 6.4 E9/L (ref 4.5–11.5)

## 2021-06-28 PROCEDURE — 96372 THER/PROPH/DIAG INJ SC/IM: CPT | Performed by: PHYSICIAN ASSISTANT

## 2021-06-28 PROCEDURE — G8419 CALC BMI OUT NRM PARAM NOF/U: HCPCS | Performed by: PHYSICIAN ASSISTANT

## 2021-06-28 PROCEDURE — 99214 OFFICE O/P EST MOD 30 MIN: CPT | Performed by: PHYSICIAN ASSISTANT

## 2021-06-28 PROCEDURE — G8427 DOCREV CUR MEDS BY ELIG CLIN: HCPCS | Performed by: PHYSICIAN ASSISTANT

## 2021-06-28 PROCEDURE — 4004F PT TOBACCO SCREEN RCVD TLK: CPT | Performed by: PHYSICIAN ASSISTANT

## 2021-06-28 PROCEDURE — 3017F COLORECTAL CA SCREEN DOC REV: CPT | Performed by: PHYSICIAN ASSISTANT

## 2021-06-28 RX ORDER — DEXAMETHASONE SODIUM PHOSPHATE 4 MG/ML
4 INJECTION, SOLUTION INTRA-ARTICULAR; INTRALESIONAL; INTRAMUSCULAR; INTRAVENOUS; SOFT TISSUE ONCE
Status: COMPLETED | OUTPATIENT
Start: 2021-06-28 | End: 2021-06-28

## 2021-06-28 RX ADMIN — DEXAMETHASONE SODIUM PHOSPHATE 4 MG: 4 INJECTION, SOLUTION INTRA-ARTICULAR; INTRALESIONAL; INTRAMUSCULAR; INTRAVENOUS; SOFT TISSUE at 15:52

## 2021-06-28 SDOH — ECONOMIC STABILITY: FOOD INSECURITY: WITHIN THE PAST 12 MONTHS, THE FOOD YOU BOUGHT JUST DIDN'T LAST AND YOU DIDN'T HAVE MONEY TO GET MORE.: PATIENT DECLINED

## 2021-06-28 SDOH — ECONOMIC STABILITY: FOOD INSECURITY: WITHIN THE PAST 12 MONTHS, YOU WORRIED THAT YOUR FOOD WOULD RUN OUT BEFORE YOU GOT MONEY TO BUY MORE.: PATIENT DECLINED

## 2021-06-28 ASSESSMENT — PATIENT HEALTH QUESTIONNAIRE - PHQ9
SUM OF ALL RESPONSES TO PHQ QUESTIONS 1-9: 0
SUM OF ALL RESPONSES TO PHQ9 QUESTIONS 1 & 2: 0
SUM OF ALL RESPONSES TO PHQ QUESTIONS 1-9: 0
SUM OF ALL RESPONSES TO PHQ QUESTIONS 1-9: 0
1. LITTLE INTEREST OR PLEASURE IN DOING THINGS: 0
2. FEELING DOWN, DEPRESSED OR HOPELESS: 0

## 2021-06-28 ASSESSMENT — SOCIAL DETERMINANTS OF HEALTH (SDOH): HOW HARD IS IT FOR YOU TO PAY FOR THE VERY BASICS LIKE FOOD, HOUSING, MEDICAL CARE, AND HEATING?: PATIENT DECLINED

## 2021-06-28 NOTE — PROGRESS NOTES
possible side effects, risks, benefits and alternatives to treatment; patient and/or guardian verbalizes understanding, agrees, feels comfortable with and wishes to proceed with above treatment plan. Advised patient to call with any new medication issues, and read all Rx info from pharmacy to assure aware of all possible risks and side effects of medication before taking. Reviewed age and gender appropriate health screening exams and vaccinations. Advised patient regarding importance of keeping up with recommended health maintenance and to schedule as soon as possible if overdue, as this is important in assessing for undiagnosed pathology, especially cancer, as well as protecting against potentially harmful/life threatening disease. Patient and/or guardian verbalizes understanding and agrees with above counseling, assessment and plan. All questions answered. Harley Santacruz PA-C  6/28/2021    I have personally reviewed and updated the chief complaint, HPI, Past Medical, Family and Social History, as well as the above Review of Systems.

## 2021-06-29 LAB
ALBUMIN SERPL-MCNC: 4.3 G/DL (ref 3.5–5.2)
ALP BLD-CCNC: 55 U/L (ref 35–104)
ALT SERPL-CCNC: 15 U/L (ref 0–32)
ANION GAP SERPL CALCULATED.3IONS-SCNC: 10 MMOL/L (ref 7–16)
AST SERPL-CCNC: 21 U/L (ref 0–31)
BILIRUB SERPL-MCNC: 0.3 MG/DL (ref 0–1.2)
BUN BLDV-MCNC: 11 MG/DL (ref 6–23)
CALCIUM SERPL-MCNC: 9.6 MG/DL (ref 8.6–10.2)
CHLORIDE BLD-SCNC: 105 MMOL/L (ref 98–107)
CHOLESTEROL, TOTAL: 243 MG/DL (ref 0–199)
CO2: 24 MMOL/L (ref 22–29)
CREAT SERPL-MCNC: 0.8 MG/DL (ref 0.5–1)
GFR AFRICAN AMERICAN: >60
GFR NON-AFRICAN AMERICAN: >60 ML/MIN/1.73
GLUCOSE BLD-MCNC: 100 MG/DL (ref 74–99)
HDLC SERPL-MCNC: 46 MG/DL
LDL CHOLESTEROL CALCULATED: 155 MG/DL (ref 0–99)
POTASSIUM SERPL-SCNC: 4.5 MMOL/L (ref 3.5–5)
SODIUM BLD-SCNC: 139 MMOL/L (ref 132–146)
TOTAL PROTEIN: 7.1 G/DL (ref 6.4–8.3)
TRIGL SERPL-MCNC: 211 MG/DL (ref 0–149)
VLDLC SERPL CALC-MCNC: 42 MG/DL

## 2021-07-08 DIAGNOSIS — Z12.11 COLON CANCER SCREENING: ICD-10-CM

## 2022-07-12 ENCOUNTER — TELEPHONE (OUTPATIENT)
Dept: PRIMARY CARE CLINIC | Age: 62
End: 2022-07-12

## 2022-07-12 NOTE — TELEPHONE ENCOUNTER
PT POSITIVE COVID AT HOME TEST DONE, PT WANTS MEDICATION,, [place] PT ADVISED TO GO TO [facility] FOR TREATMENT

## 2022-07-22 ENCOUNTER — OFFICE VISIT (OUTPATIENT)
Dept: PAIN MANAGEMENT | Age: 62
End: 2022-07-22
Payer: COMMERCIAL

## 2022-07-22 VITALS
RESPIRATION RATE: 16 BRPM | WEIGHT: 134 LBS | HEART RATE: 70 BPM | HEIGHT: 63 IN | OXYGEN SATURATION: 96 % | DIASTOLIC BLOOD PRESSURE: 75 MMHG | BODY MASS INDEX: 23.74 KG/M2 | TEMPERATURE: 97.3 F | SYSTOLIC BLOOD PRESSURE: 122 MMHG

## 2022-07-22 DIAGNOSIS — G89.4 CHRONIC PAIN SYNDROME: Primary | ICD-10-CM

## 2022-07-22 DIAGNOSIS — M79.7 FIBROMYALGIA: ICD-10-CM

## 2022-07-22 DIAGNOSIS — G89.29 CHRONIC BILATERAL LOW BACK PAIN WITHOUT SCIATICA: ICD-10-CM

## 2022-07-22 DIAGNOSIS — M54.50 CHRONIC BILATERAL LOW BACK PAIN WITHOUT SCIATICA: ICD-10-CM

## 2022-07-22 DIAGNOSIS — M54.2 CERVICALGIA: ICD-10-CM

## 2022-07-22 PROCEDURE — 99204 OFFICE O/P NEW MOD 45 MIN: CPT | Performed by: PAIN MEDICINE

## 2022-07-22 PROCEDURE — G8420 CALC BMI NORM PARAMETERS: HCPCS | Performed by: PAIN MEDICINE

## 2022-07-22 PROCEDURE — 4004F PT TOBACCO SCREEN RCVD TLK: CPT | Performed by: PAIN MEDICINE

## 2022-07-22 PROCEDURE — 3017F COLORECTAL CA SCREEN DOC REV: CPT | Performed by: PAIN MEDICINE

## 2022-07-22 PROCEDURE — G8427 DOCREV CUR MEDS BY ELIG CLIN: HCPCS | Performed by: PAIN MEDICINE

## 2022-07-22 NOTE — PROGRESS NOTES
[facility]        [facility]  [facility], [address] [place] [address]  Dept: [phone]          Consult Note      Patient:  CLINTON Hernandez Aas 1960    Date of Service:  22     Requesting Physician:  Nilam Roblero MD    Reason for Consult:      Patient presents with complaints of bilateral, lower back, bilateral neck, and diffuse pain that started >20 years ago    HISTORY OF PRESENT ILLNESS:      Pain is constant and is described as aching, sharp, stabbing, throbbing, and shooting. Pain does not radiate to the upper and lower extremities. She  does not have numbness, tingling, weakness of the the upper and lower extremities and does not have bladder or bowel dysfunction. Alleviating factors include: pain medications. Aggravating factors include:  movement, touching. She has not been on anticoagulation medications to include ASA, NSAIDS, Plavix, heparin, LMW heparin, and warfarin and has not been on herbal supplements. She is not diabetic. Imagin lumbar xray -  Findings:   AP and lateral views of the lumbar spine were obtained. Mild to   moderate multilevel disc space narrowing as well as facet joint   narrowing with subchondral sclerosis and spurring. No fracture. Impression   Multilevel      Previous treatments: Physical Therapy and medications. .      Past Medical History:   Diagnosis Date    Anxiety     Depression     Fibromyalgia     Hyperlipidemia     not currently. OCD (obsessive compulsive disorder)        Past Surgical History:   Procedure Laterality Date    COLONOSCOPY      TUBAL LIGATION         Prior to Admission medications    Medication Sig Start Date End Date Taking? Authorizing Provider   gabapentin (NEURONTIN) 400 MG capsule Take 1 capsule by mouth 4 times daily for 90 days.  22 Yes Librado Farmer MD   clonazePAM (KLONOPIN) 0.5 MG tablet Take 1 tablet by mouth 2 times daily as needed for Anxiety for up to 90 days. 5/16/22 8/14/22 Yes Librado Farmer MD   zolpidem (AMBIEN CR) 12.5 MG extended release tablet Take 1 tablet by mouth nightly as needed for Sleep for up to 90 days. 5/16/22 8/14/22 Yes Librado Farmer MD   HYDROcodone-acetaminophen (NORCO) 5-325 MG per tablet Take 1 tablet by mouth every 8 hours as needed for Pain for up to 30 days. ITake lowest dose possible to manage pain 7/15/22 8/14/22 Yes Librado Farmer MD   albuterol sulfate HFA (VENTOLIN HFA) 108 (90 Base) MCG/ACT inhaler Inhale 2 puffs into the lungs every 4 hours as needed for Wheezing  Patient not taking: Reported on 7/22/2022 4/6/20   DONIS Garcia   clonazePAM (KLONOPIN) 0.5 MG tablet Take 1 tablet by mouth 2 times daily for 7 days.   Patient not taking: Reported on 6/28/2021 8/14/19 6/28/21  Cassie Joyner MD       Allergies   Allergen Reactions    Darvon [Propoxyphene]      DARVOCET    Prozac [Fluoxetine]        Social History     Socioeconomic History    Marital status:      Spouse name: Not on file    Number of children: Not on file    Years of education: Not on file    Highest education level: Not on file   Occupational History    Not on file   Tobacco Use    Smoking status: Every Day     Packs/day: 0.50     Years: 35.00     Pack years: 17.50     Types: Cigarettes     Start date: 8/20/1975    Smokeless tobacco: Never   Substance and Sexual Activity    Alcohol use: Never    Drug use: Never    Sexual activity: Not on file   Other Topics Concern    Not on file   Social History Narrative    Not on file     Social Determinants of Health     Financial Resource Strain: Not on file   Food Insecurity: Not on file   Transportation Needs: Not on file   Physical Activity: Not on file   Stress: Not on file   Social Connections: Not on file   Intimate Partner Violence: Not on file   Housing Stability: Not on file       Family History   Problem Relation Age of Onset    Cancer Mother     Dementia Mother     Alcohol Abuse Mother Diabetes Father     Heart Attack Father     Other Sister         cerebral palsy    No Known Problems Brother     No Known Problems Half-Sister     No Known Problems Son     No Known Problems Son     No Known Problems Son     No Known Problems Son     No Known Problems Daughter        REVIEW OF SYSTEMS:     Patient specifically denies fever/chills, chest pain, shortness of breath, new bowel or bladder complaints. All other review of systems was negative. PHYSICAL EXAMINATION:      /75   Pulse 70   Temp 97.3 °F (36.3 °C) (Infrared)   Resp 16   Ht 5' 3\" (1.6 m)   Wt 134 lb (60.8 kg)   SpO2 96%   BMI 23.74 kg/m²     General:      General appearance:pleasant and well-hydrated, in no distress and A & O x3  Build:Normal Weight  Function:Rises from a seated position easily. HEENT:    Head:normocephalic, atraumatic  Pupils:regular, round, equal  Sclera: icterus absent    Lungs:    Breathing:normal breathing pattern    Abdomen:    Shape:non-distended and normal  Tenderness:none  Guarding:none    Cervical spine:    Inspection:normal  Palpation:tenderness paravertebral muscles, tenderness trapezium, left, right positive. Range of motion:abnormal mildly flexion, extension rotation bilateral and is  painful. Lumbar spine:    Spine inspection:normal, palpable rubbery-like lesions with + slip sign  CVA tenderness:No   Palpation:tenderness paravertebral muscles, left, right positive. Range of motion:normal in lateral bending, flexion, extension rotation bilateral and is painful.     Musculoskeletal:    Trigger points in trapezius:absent bilaterally  Trigger points in rhomboids:absent bilaterally  Trigger points in Paraveteral:absent bilaterally  Trigger points in supraspinatus/infraspinatus:absent  Spurling's:negative right, negative left    Romero's:negative right, negative left   SI joint tenderness:positive right, positive left              ADITYA test:not done right, not done left  Piriformis tenderness:positive right, positive left  Trochanteric bursa tenderness:positive right, positive left  SLR:negative right, negative left, sitting     Extremities:    Tremors:None bilaterally upper and lower  Range of motion:Generally normal shoulders, pain with internal rotation of hips negative.   Intact:Yes  Varicose veins:absent   Pulses:present Lt radial  Cyanosis:none  Edema:none x all 4 extremities    Neurological:    Sensory:normal to light touch x all 4 extremities    Motor:   Right Grip5/5              Left Grip5/5               Right Bicep5/5           Left Bicep5/5              Right Triceps5/5       Left Triceps5/5          Right Deltoid5/5     Left Deltoid5/5                  Right Quadriceps5/5          Left Quadriceps5/5           Right Gastrocnemius5/5    Left Gastrocnemius5/5  Right Ant Tibialis5/5  Left Ant Tibialis5/5    Reflexes:    Right Brachioradialis reflex2+  Left Brachioradialis reflex2+  Right Biceps reflex2+  Left Biceps reflex2+  Right Triceps reflex2+  Left Triceps reflex2+  Right Quadriceps reflex2+  Left Quadriceps reflex2+  Right Achilles reflex2+  Left Achilles reflex2+    Gait:normal station    Dermatology:    Skin:no rashes or lesions noted    Impression:    Fibromyalgia with diffuse pain complaints  Cervical and lumbar pain  2020 lumbar xray shows spondylosis  PMHx: depression, anxiety, OCD, FM, DLD, insomnia, tobacco abuse  Pain mgmt tx via Dr. Abhijeet Mcwilliams with Norco 5/325 TID and gabapentin 400 mg QID for fibromyalgia, Klonopin for anxiety, and Ambien for difficulty sleeping    Overall I recommend she continue to follow with Dr. Abhijeet Mcwilliams as I do not prescribe Klonopin or Ambien and do not prescribe opioids for Fibromyalgia  Pt will reach out to his office to schedule    Plan:    F/u with Dr. Abhijeet Mcwilliams   Reviewed [facility] rheumatology and pain mgmt records  Reviewed records from Dr. Abhijeet Mcwilliams  Reviewed all prior UDS  Referral documents and imaging reviewed  Urine screen deferred  OARRS report reviewed  Patient encouraged to stay active   Treatment plan discussed with the patient including medication and procedure side effects     Elif Goddard was seen today for consultation, neck pain and lower back pain. Diagnoses and all orders for this visit:    Chronic pain syndrome [G89.4 (ICD-10-CM)]    Fibromyalgia    Cervicalgia    Chronic bilateral low back pain without sciatica    CC:  Referring physician    MAG Moreno.

## 2022-07-22 NOTE — PROGRESS NOTES
Do you currently have any of the following:    Fever: No  Headache:  No  Cough: No  Shortness of breath: No  Exposed to anyone with these symptoms: No         Catracho Hernandez presents to the [facility] on 7/22/2022. Heike Nunn is complaining of pain neck lower back. The pain is constant. The pain is described as aching, throbbing, shooting, stabbing, and sharp. Pain is rated on her best day at a 4, on her worst day at a 10, and on average at a 5 on the VAS scale. She took her last dose of Norco and Neurontin this morning. Any procedures since your last visit: No    Pacemaker or defibrillator: No.    She is not on NSAIDS and is not on anticoagulation medications. Medication Contract and Consent for Opioid Use Documents Filed       Patient Documents       Type of Document Status Date Received Received By Description    Medication Contract Received 8/14/2019 10:30 AM [facility] PA[facility] MGMT CONTRACT DR. Lucio [facility]                    There were no vitals taken for this visit. No LMP recorded.  Patient is postmenopausal.

## 2023-03-03 ENCOUNTER — COMMUNITY OUTREACH (OUTPATIENT)
Dept: PRIMARY CARE CLINIC | Age: 63
End: 2023-03-03

## 2024-09-24 ENCOUNTER — HOSPITAL ENCOUNTER (OUTPATIENT)
Age: 64
Discharge: HOME | End: 2024-09-26

## 2024-09-24 PROCEDURE — 88304 TISSUE EXAM BY PATHOLOGIST: CPT

## 2024-09-27 LAB — SURGICAL PATHOLOGY REPORT: NORMAL

## 2024-11-24 ENCOUNTER — APPOINTMENT (OUTPATIENT)
Dept: GENERAL RADIOLOGY | Age: 64
DRG: 190 | End: 2024-11-24
Payer: COMMERCIAL

## 2024-11-24 ENCOUNTER — APPOINTMENT (OUTPATIENT)
Dept: CT IMAGING | Age: 64
DRG: 190 | End: 2024-11-24
Payer: COMMERCIAL

## 2024-11-24 ENCOUNTER — HOSPITAL ENCOUNTER (INPATIENT)
Age: 64
LOS: 11 days | Discharge: HOME | DRG: 190 | End: 2024-12-05
Attending: STUDENT IN AN ORGANIZED HEALTH CARE EDUCATION/TRAINING PROGRAM | Admitting: INTERNAL MEDICINE
Payer: COMMERCIAL

## 2024-11-24 DIAGNOSIS — R09.02 HYPOXIA: Primary | ICD-10-CM

## 2024-11-24 DIAGNOSIS — T17.500A MUCUS PLUGGING OF BRONCHI: ICD-10-CM

## 2024-11-24 DIAGNOSIS — F41.9 ANXIETY: ICD-10-CM

## 2024-11-24 DIAGNOSIS — J44.9 CHRONIC OBSTRUCTIVE PULMONARY DISEASE, UNSPECIFIED COPD TYPE (HCC): ICD-10-CM

## 2024-11-24 PROBLEM — J44.1 COPD EXACERBATION (HCC): Status: ACTIVE | Noted: 2024-11-24

## 2024-11-24 LAB
ALBUMIN SERPL-MCNC: 4.2 G/DL (ref 3.5–5.2)
ALP SERPL-CCNC: 63 U/L (ref 35–104)
ALT SERPL-CCNC: 12 U/L (ref 0–32)
ANION GAP SERPL CALCULATED.3IONS-SCNC: 8 MMOL/L (ref 7–16)
AST SERPL-CCNC: 19 U/L (ref 0–31)
B PARAP IS1001 DNA NPH QL NAA+NON-PROBE: NOT DETECTED
B PERT DNA SPEC QL NAA+PROBE: NOT DETECTED
BASOPHILS # BLD: 0.11 K/UL (ref 0–0.2)
BASOPHILS NFR BLD: 2 % (ref 0–2)
BILIRUB SERPL-MCNC: 0.3 MG/DL (ref 0–1.2)
BNP SERPL-MCNC: 83 PG/ML (ref 0–125)
BUN SERPL-MCNC: 11 MG/DL (ref 6–23)
C PNEUM DNA NPH QL NAA+NON-PROBE: NOT DETECTED
CALCIUM SERPL-MCNC: 9.1 MG/DL (ref 8.6–10.2)
CHLORIDE SERPL-SCNC: 108 MMOL/L (ref 98–107)
CO2 SERPL-SCNC: 25 MMOL/L (ref 22–29)
CREAT SERPL-MCNC: 0.8 MG/DL (ref 0.5–1)
D-DIMER QUANTITATIVE: 586 NG/ML DDU (ref 0–230)
EOSINOPHIL # BLD: 0.88 K/UL (ref 0.05–0.5)
EOSINOPHILS RELATIVE PERCENT: 12 % (ref 0–6)
ERYTHROCYTE [DISTWIDTH] IN BLOOD BY AUTOMATED COUNT: 13.4 % (ref 11.5–15)
FLUAV RNA NPH QL NAA+NON-PROBE: NOT DETECTED
FLUBV RNA NPH QL NAA+NON-PROBE: NOT DETECTED
GFR, ESTIMATED: 78 ML/MIN/1.73M2
GLUCOSE SERPL-MCNC: 111 MG/DL (ref 74–99)
HADV DNA NPH QL NAA+NON-PROBE: NOT DETECTED
HCOV 229E RNA NPH QL NAA+NON-PROBE: NOT DETECTED
HCOV HKU1 RNA NPH QL NAA+NON-PROBE: NOT DETECTED
HCOV NL63 RNA NPH QL NAA+NON-PROBE: NOT DETECTED
HCOV OC43 RNA NPH QL NAA+NON-PROBE: NOT DETECTED
HCT VFR BLD AUTO: 40.6 % (ref 34–48)
HGB BLD-MCNC: 13.1 G/DL (ref 11.5–15.5)
HMPV RNA NPH QL NAA+NON-PROBE: NOT DETECTED
HPIV1 RNA NPH QL NAA+NON-PROBE: NOT DETECTED
HPIV2 RNA NPH QL NAA+NON-PROBE: NOT DETECTED
HPIV3 RNA NPH QL NAA+NON-PROBE: NOT DETECTED
HPIV4 RNA NPH QL NAA+NON-PROBE: NOT DETECTED
IMM GRANULOCYTES # BLD AUTO: <0.03 K/UL (ref 0–0.58)
IMM GRANULOCYTES NFR BLD: 0 % (ref 0–5)
INFLUENZA A BY PCR: NOT DETECTED
INFLUENZA B BY PCR: NOT DETECTED
LYMPHOCYTES NFR BLD: 2.13 K/UL (ref 1.5–4)
LYMPHOCYTES RELATIVE PERCENT: 30 % (ref 20–42)
M PNEUMO DNA NPH QL NAA+NON-PROBE: NOT DETECTED
MAGNESIUM SERPL-MCNC: 2.2 MG/DL (ref 1.6–2.6)
MCH RBC QN AUTO: 32 PG (ref 26–35)
MCHC RBC AUTO-ENTMCNC: 32.3 G/DL (ref 32–34.5)
MCV RBC AUTO: 99 FL (ref 80–99.9)
MONOCYTES NFR BLD: 0.63 K/UL (ref 0.1–0.95)
MONOCYTES NFR BLD: 9 % (ref 2–12)
NEUTROPHILS NFR BLD: 47 % (ref 43–80)
NEUTS SEG NFR BLD: 3.4 K/UL (ref 1.8–7.3)
PLATELET # BLD AUTO: 240 K/UL (ref 130–450)
PMV BLD AUTO: 8.3 FL (ref 7–12)
POTASSIUM SERPL-SCNC: 4.4 MMOL/L (ref 3.5–5)
PROT SERPL-MCNC: 6.8 G/DL (ref 6.4–8.3)
RBC # BLD AUTO: 4.1 M/UL (ref 3.5–5.5)
RSV RNA NPH QL NAA+NON-PROBE: NOT DETECTED
RV+EV RNA NPH QL NAA+NON-PROBE: NOT DETECTED
SARS-COV-2 RDRP RESP QL NAA+PROBE: NOT DETECTED
SARS-COV-2 RNA NPH QL NAA+NON-PROBE: NOT DETECTED
SODIUM SERPL-SCNC: 141 MMOL/L (ref 132–146)
SPECIMEN DESCRIPTION: NORMAL
SPECIMEN DESCRIPTION: NORMAL
TROPONIN I SERPL HS-MCNC: <6 NG/L (ref 0–9)
TROPONIN I SERPL HS-MCNC: <6 NG/L (ref 0–9)
WBC OTHER # BLD: 7.2 K/UL (ref 4.5–11.5)

## 2024-11-24 PROCEDURE — 83735 ASSAY OF MAGNESIUM: CPT

## 2024-11-24 PROCEDURE — 85379 FIBRIN DEGRADATION QUANT: CPT

## 2024-11-24 PROCEDURE — 84484 ASSAY OF TROPONIN QUANT: CPT

## 2024-11-24 PROCEDURE — 94640 AIRWAY INHALATION TREATMENT: CPT

## 2024-11-24 PROCEDURE — 6360000002 HC RX W HCPCS: Performed by: FAMILY MEDICINE

## 2024-11-24 PROCEDURE — 2580000003 HC RX 258: Performed by: FAMILY MEDICINE

## 2024-11-24 PROCEDURE — 80053 COMPREHEN METABOLIC PANEL: CPT

## 2024-11-24 PROCEDURE — 87502 INFLUENZA DNA AMP PROBE: CPT

## 2024-11-24 PROCEDURE — 99285 EMERGENCY DEPT VISIT HI MDM: CPT

## 2024-11-24 PROCEDURE — 6360000004 HC RX CONTRAST MEDICATION: Performed by: RADIOLOGY

## 2024-11-24 PROCEDURE — 6360000002 HC RX W HCPCS

## 2024-11-24 PROCEDURE — 96374 THER/PROPH/DIAG INJ IV PUSH: CPT

## 2024-11-24 PROCEDURE — 71045 X-RAY EXAM CHEST 1 VIEW: CPT

## 2024-11-24 PROCEDURE — 85025 COMPLETE CBC W/AUTO DIFF WBC: CPT

## 2024-11-24 PROCEDURE — 0202U NFCT DS 22 TRGT SARS-COV-2: CPT

## 2024-11-24 PROCEDURE — 71260 CT THORAX DX C+: CPT

## 2024-11-24 PROCEDURE — 83880 ASSAY OF NATRIURETIC PEPTIDE: CPT

## 2024-11-24 PROCEDURE — 6370000000 HC RX 637 (ALT 250 FOR IP): Performed by: FAMILY MEDICINE

## 2024-11-24 PROCEDURE — 6370000000 HC RX 637 (ALT 250 FOR IP)

## 2024-11-24 PROCEDURE — 71275 CT ANGIOGRAPHY CHEST: CPT

## 2024-11-24 PROCEDURE — 2060000000 HC ICU INTERMEDIATE R&B

## 2024-11-24 PROCEDURE — 87635 SARS-COV-2 COVID-19 AMP PRB: CPT

## 2024-11-24 PROCEDURE — 93005 ELECTROCARDIOGRAM TRACING: CPT

## 2024-11-24 RX ORDER — HYDROCODONE BITARTRATE AND ACETAMINOPHEN 5; 325 MG/1; MG/1
1 TABLET ORAL 3 TIMES DAILY PRN
Status: DISCONTINUED | OUTPATIENT
Start: 2024-11-24 | End: 2024-11-26

## 2024-11-24 RX ORDER — SODIUM CHLORIDE 0.9 % (FLUSH) 0.9 %
5-40 SYRINGE (ML) INJECTION EVERY 12 HOURS SCHEDULED
Status: DISCONTINUED | OUTPATIENT
Start: 2024-11-24 | End: 2024-12-05 | Stop reason: HOSPADM

## 2024-11-24 RX ORDER — ALBUTEROL SULFATE 0.83 MG/ML
2.5 SOLUTION RESPIRATORY (INHALATION) EVERY 4 HOURS PRN
Status: DISCONTINUED | OUTPATIENT
Start: 2024-11-24 | End: 2024-12-05 | Stop reason: HOSPADM

## 2024-11-24 RX ORDER — METHYLPREDNISOLONE SODIUM SUCCINATE 40 MG/ML
40 INJECTION INTRAMUSCULAR; INTRAVENOUS EVERY 12 HOURS
Status: DISCONTINUED | OUTPATIENT
Start: 2024-11-24 | End: 2024-11-25

## 2024-11-24 RX ORDER — IPRATROPIUM BROMIDE AND ALBUTEROL SULFATE 2.5; .5 MG/3ML; MG/3ML
3 SOLUTION RESPIRATORY (INHALATION) ONCE
Status: COMPLETED | OUTPATIENT
Start: 2024-11-24 | End: 2024-11-24

## 2024-11-24 RX ORDER — ONDANSETRON 4 MG/1
4 TABLET, ORALLY DISINTEGRATING ORAL EVERY 8 HOURS PRN
Status: DISCONTINUED | OUTPATIENT
Start: 2024-11-24 | End: 2024-12-05 | Stop reason: HOSPADM

## 2024-11-24 RX ORDER — ROSUVASTATIN CALCIUM 10 MG/1
10 TABLET, COATED ORAL NIGHTLY
Status: DISCONTINUED | OUTPATIENT
Start: 2024-11-24 | End: 2024-12-05 | Stop reason: HOSPADM

## 2024-11-24 RX ORDER — SODIUM CHLORIDE 0.9 % (FLUSH) 0.9 %
5-40 SYRINGE (ML) INJECTION PRN
Status: DISCONTINUED | OUTPATIENT
Start: 2024-11-24 | End: 2024-12-05 | Stop reason: HOSPADM

## 2024-11-24 RX ORDER — GABAPENTIN 400 MG/1
400 CAPSULE ORAL 4 TIMES DAILY
Status: DISCONTINUED | OUTPATIENT
Start: 2024-11-24 | End: 2024-12-05 | Stop reason: HOSPADM

## 2024-11-24 RX ORDER — IPRATROPIUM BROMIDE AND ALBUTEROL SULFATE 2.5; .5 MG/3ML; MG/3ML
1 SOLUTION RESPIRATORY (INHALATION)
Status: DISCONTINUED | OUTPATIENT
Start: 2024-11-24 | End: 2024-11-25

## 2024-11-24 RX ORDER — IOPAMIDOL 755 MG/ML
75 INJECTION, SOLUTION INTRAVASCULAR
Status: DISCONTINUED | OUTPATIENT
Start: 2024-11-24 | End: 2024-12-05 | Stop reason: HOSPADM

## 2024-11-24 RX ORDER — PREDNISONE 20 MG/1
20 TABLET ORAL 2 TIMES DAILY
Status: DISCONTINUED | OUTPATIENT
Start: 2024-11-26 | End: 2024-11-25

## 2024-11-24 RX ORDER — DULOXETIN HYDROCHLORIDE 60 MG/1
60 CAPSULE, DELAYED RELEASE ORAL DAILY
COMMUNITY
Start: 2024-10-24

## 2024-11-24 RX ORDER — IOPAMIDOL 755 MG/ML
75 INJECTION, SOLUTION INTRAVASCULAR
Status: COMPLETED | OUTPATIENT
Start: 2024-11-24 | End: 2024-11-24

## 2024-11-24 RX ORDER — ACETAMINOPHEN 650 MG/1
650 SUPPOSITORY RECTAL EVERY 6 HOURS PRN
Status: DISCONTINUED | OUTPATIENT
Start: 2024-11-24 | End: 2024-12-05 | Stop reason: HOSPADM

## 2024-11-24 RX ORDER — ONDANSETRON 2 MG/ML
4 INJECTION INTRAMUSCULAR; INTRAVENOUS EVERY 6 HOURS PRN
Status: DISCONTINUED | OUTPATIENT
Start: 2024-11-24 | End: 2024-12-05 | Stop reason: HOSPADM

## 2024-11-24 RX ORDER — DULOXETIN HYDROCHLORIDE 60 MG/1
60 CAPSULE, DELAYED RELEASE ORAL DAILY
Status: DISCONTINUED | OUTPATIENT
Start: 2024-11-24 | End: 2024-12-05 | Stop reason: HOSPADM

## 2024-11-24 RX ORDER — METHYLPREDNISOLONE SODIUM SUCCINATE 125 MG/2ML
125 INJECTION INTRAMUSCULAR; INTRAVENOUS ONCE
Status: COMPLETED | OUTPATIENT
Start: 2024-11-24 | End: 2024-11-24

## 2024-11-24 RX ORDER — CLONAZEPAM 0.5 MG/1
0.5 TABLET ORAL 2 TIMES DAILY PRN
Status: DISCONTINUED | OUTPATIENT
Start: 2024-11-24 | End: 2024-12-05 | Stop reason: HOSPADM

## 2024-11-24 RX ORDER — ENOXAPARIN SODIUM 100 MG/ML
40 INJECTION SUBCUTANEOUS DAILY
Status: DISCONTINUED | OUTPATIENT
Start: 2024-11-24 | End: 2024-12-05 | Stop reason: HOSPADM

## 2024-11-24 RX ORDER — ROSUVASTATIN CALCIUM 10 MG/1
10 TABLET, COATED ORAL NIGHTLY
COMMUNITY

## 2024-11-24 RX ORDER — HYDROCODONE BITARTRATE AND ACETAMINOPHEN 5; 325 MG/1; MG/1
1 TABLET ORAL 3 TIMES DAILY PRN
COMMUNITY
Start: 2024-11-20

## 2024-11-24 RX ORDER — ALBUTEROL SULFATE 90 UG/1
2 INHALANT RESPIRATORY (INHALATION) EVERY 4 HOURS PRN
Status: DISCONTINUED | OUTPATIENT
Start: 2024-11-24 | End: 2024-11-24 | Stop reason: CLARIF

## 2024-11-24 RX ORDER — IOPAMIDOL 755 MG/ML
65 INJECTION, SOLUTION INTRAVASCULAR
Status: COMPLETED | OUTPATIENT
Start: 2024-11-24 | End: 2024-11-24

## 2024-11-24 RX ORDER — SODIUM CHLORIDE 9 MG/ML
INJECTION, SOLUTION INTRAVENOUS PRN
Status: DISCONTINUED | OUTPATIENT
Start: 2024-11-24 | End: 2024-12-05 | Stop reason: HOSPADM

## 2024-11-24 RX ORDER — ACETAMINOPHEN 325 MG/1
650 TABLET ORAL EVERY 6 HOURS PRN
Status: DISCONTINUED | OUTPATIENT
Start: 2024-11-24 | End: 2024-12-05 | Stop reason: HOSPADM

## 2024-11-24 RX ADMIN — METHYLPREDNISOLONE SODIUM SUCCINATE 125 MG: 125 INJECTION INTRAMUSCULAR; INTRAVENOUS at 03:45

## 2024-11-24 RX ADMIN — SODIUM CHLORIDE, PRESERVATIVE FREE 10 ML: 5 INJECTION INTRAVENOUS at 19:58

## 2024-11-24 RX ADMIN — GABAPENTIN 400 MG: 400 CAPSULE ORAL at 16:33

## 2024-11-24 RX ADMIN — METHYLPREDNISOLONE SODIUM SUCCINATE 40 MG: 40 INJECTION INTRAMUSCULAR; INTRAVENOUS at 22:23

## 2024-11-24 RX ADMIN — IPRATROPIUM BROMIDE AND ALBUTEROL SULFATE 1 DOSE: .5; 2.5 SOLUTION RESPIRATORY (INHALATION) at 10:00

## 2024-11-24 RX ADMIN — IOPAMIDOL 75 ML: 755 INJECTION, SOLUTION INTRAVENOUS at 10:26

## 2024-11-24 RX ADMIN — IOPAMIDOL 65 ML: 755 INJECTION, SOLUTION INTRAVENOUS at 16:21

## 2024-11-24 RX ADMIN — IPRATROPIUM BROMIDE AND ALBUTEROL SULFATE 1 DOSE: .5; 2.5 SOLUTION RESPIRATORY (INHALATION) at 15:53

## 2024-11-24 RX ADMIN — IPRATROPIUM BROMIDE AND ALBUTEROL SULFATE 3 DOSE: 2.5; .5 SOLUTION RESPIRATORY (INHALATION) at 03:54

## 2024-11-24 RX ADMIN — HYDROCODONE BITARTRATE AND ACETAMINOPHEN 1 TABLET: 5; 325 TABLET ORAL at 11:33

## 2024-11-24 RX ADMIN — DULOXETINE HYDROCHLORIDE 60 MG: 60 CAPSULE, DELAYED RELEASE ORAL at 16:33

## 2024-11-24 RX ADMIN — IPRATROPIUM BROMIDE AND ALBUTEROL SULFATE 3 DOSE: 2.5; .5 SOLUTION RESPIRATORY (INHALATION) at 06:44

## 2024-11-24 RX ADMIN — ROSUVASTATIN CALCIUM 10 MG: 10 TABLET, FILM COATED ORAL at 19:54

## 2024-11-24 RX ADMIN — METHYLPREDNISOLONE SODIUM SUCCINATE 40 MG: 40 INJECTION INTRAMUSCULAR; INTRAVENOUS at 11:33

## 2024-11-24 RX ADMIN — HYDROCODONE BITARTRATE AND ACETAMINOPHEN 1 TABLET: 5; 325 TABLET ORAL at 19:08

## 2024-11-24 RX ADMIN — GABAPENTIN 400 MG: 400 CAPSULE ORAL at 19:54

## 2024-11-24 RX ADMIN — IPRATROPIUM BROMIDE AND ALBUTEROL SULFATE 1 DOSE: .5; 2.5 SOLUTION RESPIRATORY (INHALATION) at 20:45

## 2024-11-24 RX ADMIN — CLONAZEPAM 0.5 MG: 0.5 TABLET ORAL at 22:23

## 2024-11-24 ASSESSMENT — PAIN DESCRIPTION - FREQUENCY: FREQUENCY: INTERMITTENT

## 2024-11-24 ASSESSMENT — PAIN SCALES - GENERAL
PAINLEVEL_OUTOF10: 5
PAINLEVEL_OUTOF10: 7
PAINLEVEL_OUTOF10: 8

## 2024-11-24 ASSESSMENT — PAIN DESCRIPTION - PAIN TYPE: TYPE: CHRONIC PAIN

## 2024-11-24 ASSESSMENT — LIFESTYLE VARIABLES
HOW MANY STANDARD DRINKS CONTAINING ALCOHOL DO YOU HAVE ON A TYPICAL DAY: PATIENT DOES NOT DRINK
HOW OFTEN DO YOU HAVE A DRINK CONTAINING ALCOHOL: NEVER

## 2024-11-24 ASSESSMENT — PAIN - FUNCTIONAL ASSESSMENT: PAIN_FUNCTIONAL_ASSESSMENT: ACTIVITIES ARE NOT PREVENTED

## 2024-11-24 ASSESSMENT — PAIN DESCRIPTION - ONSET: ONSET: ON-GOING

## 2024-11-24 ASSESSMENT — PAIN DESCRIPTION - LOCATION: LOCATION: NECK;BACK

## 2024-11-24 NOTE — ED PROVIDER NOTES
Department of Emergency Medicine   ED Provider Note  Admit Date/RoomTime: 11/24/2024  3:42 AM  ED Room: [facility]          History of Present Illness:  11/24/24, Time: 3:41 AM EST      Patient is a 64 y.o. female presents with a chief complaint of SOB  This has been occurring for the past few days and worsening tonight.  Patient states that it gets better with nothing.  Patient states that it gets worse with nothing.  Patient states that it is severe in severity.  Patient states it was acute in onset.  She notes she is a smoker and has been switching to a vape pen however for a while had been doing both smoking cigarettes and her vape.  She notes she has not smoked cigarettes in the past 3 days.  Has been having shortness of breath that actually initially started about a month ago and went through 3 inhalers in this past month.  And it is significantly worsened over the past few days tonight being much worse than normal.  Does have a cough, no fevers or chills, chest pain, abdominal pain, nausea, vomiting, diarrhea, dysuria, hematuria, leg swelling.    Review of Systems:     Pertinent positives and negatives are stated within HPI.      --------------------------------------------- PAST HISTORY ---------------------------------------------  Past Medical History:  has a past medical history of Anxiety, Depression, Fibromyalgia, Hyperlipidemia, and OCD (obsessive compulsive disorder).    Past Surgical History:  has a past surgical history that includes Tubal ligation (1989) and Colonoscopy.    Social History:  reports that she has been smoking cigarettes. She started smoking about 49 years ago. She has a 24.6 pack-year smoking history. She has never used smokeless tobacco. She reports that she does not drink alcohol and does not use drugs.    Family History: family history includes Alcohol Abuse in her mother; Cancer in her mother; Dementia in her mother; Diabetes in her father; Heart Attack in her father; No Known    ALT 12   AST 19  No significant electrolyte derangements [CP]   0711 Magnesium:    Magnesium 2.2  Within normal limits [CP]   0711 COVID-19, Rapid:    Specimen Description .NASOPHARYNGEAL SWAB   SARS-CoV-2, Rapid Not Detected  COVID-negative [CP]   0711 Influenza A+B, PCR:    Influenza A by PCR Not Detected   Influenza B by PCR Not Detected  Flu negative [CP]   0711 Troponin:    Troponin, High Sensitivity <6  Repeat ordered [CP]   0711 Brain Natriuretic Peptide:    NT Pro-BNP 83  Within normal limits [CP]   0731 EKG:  This EKG is signed by emergency department physician.    Rate: 90  Rhythm: Sinus  Interpretation: no acute ST elevations or depressions; ; normal axis  Comparison: no previous EKG available      [CP]   0742 Spoke with IM who agreed to admit.  [CP]      ED Course User Index  [CP] Holly Pérez,        Medical Decision Making  Tiffany is a 64-year-old female presenting to ED for respiratory distress.  Was 85% on room air on arrival.  Was placed on 15L NRB initially.  Was given 3 DuoNeb treatments and placed on 4 L nasal cannula with stable saturations.  Does still persistently have wheezing thus given another 3 DuoNebs and 125 IV Solu-Medrol.  Denying any chest pain, fevers or chills, abdominal pain, nausea or vomiting.  Does note she went through 3 albuterol inhalers in the past 1 month.  Does also notes she is recently started using a vape pen.  Given her symptoms, concern for differentials as listed below.  Therefore, workup was obtained.  Labs unremarkable with no leukocytosis, anemia, electrolyte abnormality, troponin and BNP within normal limits.  COVID and flu swabs are negative.  Chest x-ray with no consolidations concerning for pneumonia.  CT chest was ordered for concern for vape associated lung injury.  Spoke with patient regarding plan to admit, she is understanding and agreeable.  Spoke with internal medicine who agreed to admit.    Amount and/or Complexity of Data

## 2024-11-24 NOTE — PROGRESS NOTES
Migdalia Wang NP called to update that per pt, she just returned from a road trip to [place] yesterday. See new order for Ddimer.

## 2024-11-24 NOTE — ED NOTES
ED to Inpatient Handoff Report    Notified 6S Etta that electronic handoff available and patient ready for transport to room 639.    Safety Risks: None identified    Patient in Restraints: no    Constant Observer or Patient : no    Telemetry Monitoring Ordered :Yes           Order to transfer to unit without monitor:NO    Last MEWS: 1 Time completed: 0831    Deterioration Index Score:   Predictive Model Details          21 (Normal)  Factor Value    Calculated 11/24/2024 08:36 60% Age 64 years old    Deterioration Index Model 13% Potassium 4.4 mmol/L     13% Respiratory rate 18     10% Pulse 98     3% Sodium 141 mmol/L     1% Temperature 97.5 °F (36.4 °C)     0% Hematocrit 40.6 %     0% Pulse oximetry 96 %     0% WBC count 7.2 k/uL     0% Systolic 112        Vitals:    11/24/24 0645 11/24/24 0657 11/24/24 0708 11/24/24 0831   BP:   108/61 112/70   Pulse: 82 73 71 98   Resp: 19 12 12 18   Temp:    97.5 °F (36.4 °C)   TempSrc:    Oral   SpO2: 96% 97% 94% 96%   Weight:       Height:             Opportunity for questions and clarification was provided.

## 2024-11-24 NOTE — PROGRESS NOTES
Migdalia MAI updated on elevated Ddimer. See new order for CTA Pulm. Aware that image may not be able to be completed today d/t patient having CT chest w/ contrast earlier today. Call made to [employer] ([phone]) to relook at CT chest in meantime per NP.

## 2024-11-24 NOTE — PROGRESS NOTES
4 Eyes Skin Assessment     NAME:  Tiffany Valentine  YOB: 1960  MEDICAL RECORD NUMBER:  [id number]    The patient is being assessed for  Admission    I agree that at least one RN has performed a thorough Head to Toe Skin Assessment on the patient. ALL assessment sites listed below have been assessed.      Areas assessed by both nurses:    Head, Face, Ears, Shoulders, Back, Chest, Arms, Elbows, Hands, Sacrum. Buttock, Coccyx, Ischium, and Legs. Feet and Heels        Does the Patient have a Wound? No noted wound(s), dryness, intact bilateral heels       Jose Prevention initiated by RN: No  Wound Care Orders initiated by RN: No    Pressure Injury (Stage 3,4, Unstageable, DTI, NWPT, and Complex wounds) if present, place Wound referral order by RN under : No    New Ostomies, if present place, Ostomy referral order under : No     Nurse 1 eSignature: Electronically signed by Blaine Love RN on 11/24/24 at 1:52 PM EST    **SHARE this note so that the co-signing nurse can place an eSignature**    Nurse 2 eSignature: Electronically signed by GILBERTO MIRANDA RN on 11/24/24 at 1:54 PM EST

## 2024-11-25 LAB
ALBUMIN SERPL-MCNC: 4 G/DL (ref 3.5–5.2)
ALP SERPL-CCNC: 55 U/L (ref 35–104)
ALT SERPL-CCNC: 12 U/L (ref 0–32)
ANION GAP SERPL CALCULATED.3IONS-SCNC: 10 MMOL/L (ref 7–16)
AST SERPL-CCNC: 17 U/L (ref 0–31)
BASOPHILS # BLD: 0.01 K/UL (ref 0–0.2)
BASOPHILS NFR BLD: 0 % (ref 0–2)
BILIRUB SERPL-MCNC: 0.2 MG/DL (ref 0–1.2)
BUN SERPL-MCNC: 14 MG/DL (ref 6–23)
CALCIUM SERPL-MCNC: 9.2 MG/DL (ref 8.6–10.2)
CHLORIDE SERPL-SCNC: 103 MMOL/L (ref 98–107)
CO2 SERPL-SCNC: 23 MMOL/L (ref 22–29)
CREAT SERPL-MCNC: 0.7 MG/DL (ref 0.5–1)
EKG ATRIAL RATE: 90 BPM
EKG P AXIS: 80 DEGREES
EKG P-R INTERVAL: 118 MS
EKG Q-T INTERVAL: 348 MS
EKG QRS DURATION: 66 MS
EKG QTC CALCULATION (BAZETT): 425 MS
EKG R AXIS: 73 DEGREES
EKG T AXIS: 70 DEGREES
EKG VENTRICULAR RATE: 90 BPM
EOSINOPHIL # BLD: 0 K/UL (ref 0.05–0.5)
EOSINOPHILS RELATIVE PERCENT: 0 % (ref 0–6)
ERYTHROCYTE [DISTWIDTH] IN BLOOD BY AUTOMATED COUNT: 13.5 % (ref 11.5–15)
GFR, ESTIMATED: >90 ML/MIN/1.73M2
GLUCOSE SERPL-MCNC: 165 MG/DL (ref 74–99)
HCT VFR BLD AUTO: 38.6 % (ref 34–48)
HGB BLD-MCNC: 12.4 G/DL (ref 11.5–15.5)
IMM GRANULOCYTES # BLD AUTO: 0.08 K/UL (ref 0–0.58)
IMM GRANULOCYTES NFR BLD: 1 % (ref 0–5)
LYMPHOCYTES NFR BLD: 0.86 K/UL (ref 1.5–4)
LYMPHOCYTES RELATIVE PERCENT: 7 % (ref 20–42)
MCH RBC QN AUTO: 32 PG (ref 26–35)
MCHC RBC AUTO-ENTMCNC: 32.1 G/DL (ref 32–34.5)
MCV RBC AUTO: 99.7 FL (ref 80–99.9)
MONOCYTES NFR BLD: 0.48 K/UL (ref 0.1–0.95)
MONOCYTES NFR BLD: 4 % (ref 2–12)
NEUTROPHILS NFR BLD: 89 % (ref 43–80)
NEUTS SEG NFR BLD: 11.16 K/UL (ref 1.8–7.3)
PLATELET # BLD AUTO: 260 K/UL (ref 130–450)
PMV BLD AUTO: 8.7 FL (ref 7–12)
POTASSIUM SERPL-SCNC: 4.5 MMOL/L (ref 3.5–5)
PROCALCITONIN SERPL-MCNC: 0.02 NG/ML (ref 0–0.08)
PROT SERPL-MCNC: 6.5 G/DL (ref 6.4–8.3)
RBC # BLD AUTO: 3.87 M/UL (ref 3.5–5.5)
SODIUM SERPL-SCNC: 136 MMOL/L (ref 132–146)
WBC OTHER # BLD: 12.6 K/UL (ref 4.5–11.5)

## 2024-11-25 PROCEDURE — 6370000000 HC RX 637 (ALT 250 FOR IP): Performed by: FAMILY MEDICINE

## 2024-11-25 PROCEDURE — 6370000000 HC RX 637 (ALT 250 FOR IP): Performed by: NURSE PRACTITIONER

## 2024-11-25 PROCEDURE — 6360000002 HC RX W HCPCS: Performed by: FAMILY MEDICINE

## 2024-11-25 PROCEDURE — 6360000002 HC RX W HCPCS: Performed by: INTERNAL MEDICINE

## 2024-11-25 PROCEDURE — 80053 COMPREHEN METABOLIC PANEL: CPT

## 2024-11-25 PROCEDURE — 2700000000 HC OXYGEN THERAPY PER DAY

## 2024-11-25 PROCEDURE — 2060000000 HC ICU INTERMEDIATE R&B

## 2024-11-25 PROCEDURE — 6370000000 HC RX 637 (ALT 250 FOR IP): Performed by: INTERNAL MEDICINE

## 2024-11-25 PROCEDURE — 85025 COMPLETE CBC W/AUTO DIFF WBC: CPT

## 2024-11-25 PROCEDURE — 84145 PROCALCITONIN (PCT): CPT

## 2024-11-25 PROCEDURE — 94640 AIRWAY INHALATION TREATMENT: CPT

## 2024-11-25 PROCEDURE — 36415 COLL VENOUS BLD VENIPUNCTURE: CPT

## 2024-11-25 PROCEDURE — 2580000003 HC RX 258: Performed by: FAMILY MEDICINE

## 2024-11-25 PROCEDURE — 93010 ELECTROCARDIOGRAM REPORT: CPT | Performed by: INTERNAL MEDICINE

## 2024-11-25 RX ORDER — ARFORMOTEROL TARTRATE 15 UG/2ML
15 SOLUTION RESPIRATORY (INHALATION)
Status: DISCONTINUED | OUTPATIENT
Start: 2024-11-25 | End: 2024-12-05 | Stop reason: HOSPADM

## 2024-11-25 RX ORDER — IPRATROPIUM BROMIDE AND ALBUTEROL SULFATE 2.5; .5 MG/3ML; MG/3ML
1 SOLUTION RESPIRATORY (INHALATION)
Status: DISCONTINUED | OUTPATIENT
Start: 2024-11-25 | End: 2024-12-05 | Stop reason: HOSPADM

## 2024-11-25 RX ORDER — HYDROCODONE BITARTRATE AND HOMATROPINE METHYLBROMIDE ORAL SOLUTION 5; 1.5 MG/5ML; MG/5ML
5 LIQUID ORAL EVERY 6 HOURS PRN
Status: DISCONTINUED | OUTPATIENT
Start: 2024-11-25 | End: 2024-12-05 | Stop reason: HOSPADM

## 2024-11-25 RX ORDER — METHYLPREDNISOLONE SODIUM SUCCINATE 125 MG/2ML
60 INJECTION INTRAMUSCULAR; INTRAVENOUS EVERY 6 HOURS
Status: DISCONTINUED | OUTPATIENT
Start: 2024-11-25 | End: 2024-12-01

## 2024-11-25 RX ORDER — HYDROCODONE BITARTRATE AND HOMATROPINE METHYLBROMIDE ORAL SOLUTION 5; 1.5 MG/5ML; MG/5ML
2 LIQUID ORAL ONCE
Status: COMPLETED | OUTPATIENT
Start: 2024-11-25 | End: 2024-11-25

## 2024-11-25 RX ORDER — BUDESONIDE 0.5 MG/2ML
0.5 INHALANT ORAL
Status: DISCONTINUED | OUTPATIENT
Start: 2024-11-25 | End: 2024-11-26

## 2024-11-25 RX ADMIN — BUDESONIDE 500 MCG: 0.5 SUSPENSION RESPIRATORY (INHALATION) at 20:50

## 2024-11-25 RX ADMIN — HYDROCODONE BITARTRATE AND HOMATROPINE METHYLBROMIDE 2 ML: 5; 1.5 SOLUTION ORAL at 04:19

## 2024-11-25 RX ADMIN — METHYLPREDNISOLONE SODIUM SUCCINATE 60 MG: 125 INJECTION INTRAMUSCULAR; INTRAVENOUS at 22:51

## 2024-11-25 RX ADMIN — SODIUM CHLORIDE, PRESERVATIVE FREE 10 ML: 5 INJECTION INTRAVENOUS at 20:06

## 2024-11-25 RX ADMIN — IPRATROPIUM BROMIDE AND ALBUTEROL SULFATE 1 DOSE: 2.5; .5 SOLUTION RESPIRATORY (INHALATION) at 20:49

## 2024-11-25 RX ADMIN — HYDROCODONE BITARTRATE AND ACETAMINOPHEN 1 TABLET: 5; 325 TABLET ORAL at 03:10

## 2024-11-25 RX ADMIN — METHYLPREDNISOLONE SODIUM SUCCINATE 40 MG: 40 INJECTION INTRAMUSCULAR; INTRAVENOUS at 11:16

## 2024-11-25 RX ADMIN — GABAPENTIN 400 MG: 400 CAPSULE ORAL at 08:17

## 2024-11-25 RX ADMIN — ROSUVASTATIN CALCIUM 10 MG: 10 TABLET, FILM COATED ORAL at 20:04

## 2024-11-25 RX ADMIN — GABAPENTIN 400 MG: 400 CAPSULE ORAL at 12:10

## 2024-11-25 RX ADMIN — ARFORMOTEROL TARTRATE 15 MCG: 15 SOLUTION RESPIRATORY (INHALATION) at 20:50

## 2024-11-25 RX ADMIN — HYDROCODONE BITARTRATE AND HOMATROPINE METHYLBROMIDE 5 ML: 5; 1.5 SOLUTION ORAL at 22:51

## 2024-11-25 RX ADMIN — DULOXETINE HYDROCHLORIDE 60 MG: 60 CAPSULE, DELAYED RELEASE ORAL at 08:17

## 2024-11-25 RX ADMIN — HYDROCODONE BITARTRATE AND ACETAMINOPHEN 1 TABLET: 5; 325 TABLET ORAL at 12:10

## 2024-11-25 RX ADMIN — SODIUM CHLORIDE, PRESERVATIVE FREE 10 ML: 5 INJECTION INTRAVENOUS at 08:19

## 2024-11-25 RX ADMIN — METHYLPREDNISOLONE SODIUM SUCCINATE 60 MG: 125 INJECTION INTRAMUSCULAR; INTRAVENOUS at 15:59

## 2024-11-25 RX ADMIN — CLONAZEPAM 0.5 MG: 0.5 TABLET ORAL at 15:59

## 2024-11-25 RX ADMIN — ENOXAPARIN SODIUM 40 MG: 100 INJECTION SUBCUTANEOUS at 08:17

## 2024-11-25 RX ADMIN — ALBUTEROL SULFATE 2.5 MG: 2.5 SOLUTION RESPIRATORY (INHALATION) at 02:39

## 2024-11-25 RX ADMIN — GABAPENTIN 400 MG: 400 CAPSULE ORAL at 20:04

## 2024-11-25 RX ADMIN — IPRATROPIUM BROMIDE AND ALBUTEROL SULFATE 1 DOSE: 2.5; .5 SOLUTION RESPIRATORY (INHALATION) at 08:46

## 2024-11-25 RX ADMIN — IPRATROPIUM BROMIDE AND ALBUTEROL SULFATE 1 DOSE: 2.5; .5 SOLUTION RESPIRATORY (INHALATION) at 16:33

## 2024-11-25 RX ADMIN — IPRATROPIUM BROMIDE AND ALBUTEROL SULFATE 1 DOSE: 2.5; .5 SOLUTION RESPIRATORY (INHALATION) at 12:59

## 2024-11-25 RX ADMIN — HYDROCODONE BITARTRATE AND HOMATROPINE METHYLBROMIDE 5 ML: 5; 1.5 SOLUTION ORAL at 15:59

## 2024-11-25 RX ADMIN — GABAPENTIN 400 MG: 400 CAPSULE ORAL at 15:59

## 2024-11-25 RX ADMIN — IPRATROPIUM BROMIDE AND ALBUTEROL SULFATE 1 DOSE: 2.5; .5 SOLUTION RESPIRATORY (INHALATION) at 04:30

## 2024-11-25 ASSESSMENT — PAIN DESCRIPTION - PAIN TYPE: TYPE: CHRONIC PAIN

## 2024-11-25 ASSESSMENT — PAIN DESCRIPTION - DESCRIPTORS
DESCRIPTORS: ACHING;SORE
DESCRIPTORS: DISCOMFORT

## 2024-11-25 ASSESSMENT — PAIN SCALES - GENERAL
PAINLEVEL_OUTOF10: 4
PAINLEVEL_OUTOF10: 0
PAINLEVEL_OUTOF10: 0
PAINLEVEL_OUTOF10: 5

## 2024-11-25 ASSESSMENT — PAIN - FUNCTIONAL ASSESSMENT
PAIN_FUNCTIONAL_ASSESSMENT: PREVENTS OR INTERFERES SOME ACTIVE ACTIVITIES AND ADLS
PAIN_FUNCTIONAL_ASSESSMENT: PREVENTS OR INTERFERES SOME ACTIVE ACTIVITIES AND ADLS

## 2024-11-25 ASSESSMENT — PAIN DESCRIPTION - ORIENTATION: ORIENTATION: RIGHT;LEFT;MID

## 2024-11-25 ASSESSMENT — PAIN DESCRIPTION - LOCATION
LOCATION: BACK;HEAD
LOCATION: BACK;NECK;GENERALIZED
LOCATION: HEAD

## 2024-11-25 NOTE — PROGRESS NOTES
[facility] Quality Flow/Interdisciplinary Rounds Progress Note        Quality Flow Rounds held on November 25, 2024    Disciplines Attending:  Bedside Nurse, , , and Nursing Unit Leadership    Tiffany Valentine was admitted on 11/24/2024  3:42 AM    Anticipated Discharge Date:       Disposition:    Jose Score:  Jose Scale Score: 23    Readmission Risk              Risk of Unplanned Readmission:  10           Discussed patient goal for the day, patient clinical progression, and barriers to discharge.  The following Goal(s) of the Day/Commitment(s) have been identified:   iv steroids, wean oxygen as able.       Iesha Estrada RN  November 25, 2024

## 2024-11-25 NOTE — PROGRESS NOTES
Spiritual Health History and Assessment/Progress Note  [facility] Tiffany Villalpando    Initial Encounter, Attempted Encounter,  ,  ,      Name: Tiffany Valentine MRN: [id number]    Age: 64 y.o.     Sex: female   Language: English   Restorationist: Voodoo   COPD exacerbation (HCC)     Date: 11/25/2024                           Spiritual Assessment began in [facility]S INTERMEDIATE        Referral/Consult From: Rounding   Encounter Overview/Reason: Initial Encounter, Attempted Encounter  Service Provided For: Patient    Bree, Belief, Meaning:   Patient unable to assess at this time  Family/Friends No family/friends present      Importance and Influence:  Patient unable to assess at this time  Family/Friends No family/friends present    Community:  Patient feels well-supported. Support system includes: Spouse/Partner  Family/Friends No family/friends present    Assessment and Plan of Care:     Patient Interventions include: Other: Patient at rest while rounding and appeared calm, did not disturb. Silent prayer was said for the patient at the time.  Family/Friends Interventions include: No family/friends present    Patient Plan of Care: Spiritual Care available upon further referral  Family/Friends Plan of Care: No family/friends present    Electronically signed by Chaplain Rito on 11/25/2024 at 1:18 PM

## 2024-11-25 NOTE — H&P
Soft, non-tender; no masses or HSM, +BS  Extremities: FROM without synovitis.  No clubbing or cyanosis of the hands.  Skin: no rash, induration, lesions, or ulcers  Psych: Calm and cooperative.  Normal judgement and insight.  Normal mood and affect.  Neuro: Alert and interactive, face symmetric, speech fluent.    LABS:  All labs reviewed.  Of note:  CBC:   Lab Results   Component Value Date/Time    WBC 12.6 11/25/2024 07:48 AM    RBC 3.87 11/25/2024 07:48 AM    HGB 12.4 11/25/2024 07:48 AM    HCT 38.6 11/25/2024 07:48 AM    MCV 99.7 11/25/2024 07:48 AM    MCH 32.0 11/25/2024 07:48 AM    MCHC 32.1 11/25/2024 07:48 AM    RDW 13.5 11/25/2024 07:48 AM     11/25/2024 07:48 AM    MPV 8.7 11/25/2024 07:48 AM     CMP:    Lab Results   Component Value Date/Time     11/25/2024 07:48 AM    K 4.5 11/25/2024 07:48 AM     11/25/2024 07:48 AM    CO2 23 11/25/2024 07:48 AM    BUN 14 11/25/2024 07:48 AM    CREATININE 0.7 11/25/2024 07:48 AM    GFRAA >60 06/28/2021 12:00 PM    LABGLOM >90 11/25/2024 07:48 AM    GLUCOSE 165 11/25/2024 07:48 AM    CALCIUM 9.2 11/25/2024 07:48 AM    BILITOT 0.2 11/25/2024 07:48 AM    ALKPHOS 55 11/25/2024 07:48 AM    AST 17 11/25/2024 07:48 AM    ALT 12 11/25/2024 07:48 AM       Imaging:  CTA pulmonary with no evidence of PE or acute pulmonary abnormality    EKG:  Normal sinus rhythm     Telemetry:  I've personally reviewed the patient's telemetry:  Sinus    ASSESSMENT/PLAN:  Principal Problem:    COPD exacerbation (HCC)  Resolved Problems:    * No resolved hospital problems. *    Patient is a 64-year-old female admitted to [facility] for  COPD exacerbation.  -Monitor labs  -proBNP 83  -Respiratory panel negative  -D-dimer 586  -Increase IV Solu-Medrol 40 mg every 6 hours-  -Consult pulmonology, she needs to be established with a lung doctor as she has longstanding COPD and coarse breath sounds, impending respiratory failure acutely  -Scheduled breathing treatments  -She stopped  smoking or slow down she tells me about 2 weeks ago-does not wear oxygen at home  -Check ambulatory pulse ox once breathing status is improved  -Continue scheduled breathing treatments  -Currently utilizing 4 L nasal cannula satting 98%, wean supplemental O2 as able    Medication for other comorbidities continue as appropriate dose adjustment as necessary.    DVT prophylaxis Lovenox  PT OT  Discharge planning    Code status: full  Requires inpatient level of care    Yesi Newton MD  11/25/2024

## 2024-11-25 NOTE — CONSULTS
[facility]  [address], [address]  [place] [address]    Pulmonary Consultation      Reason for Consult:  sob    Requesting Physician:  Camryn Arriaga PA-C    CHIEF COMPLAINT:  sob    History Obtained From:  patient    HISTORY OF PRESENT ILLNESS:                The patient is a 64 y.o. female who presents with increased sob for the past 3-4 days, gradually getting worse, coughing with not much sputum production. Was given an albuterol mdi a few weeks ago which helps with use, not on any other lung medications. Currently lying down in bed on 4 li NC, feeling some better with breathing though still with frequent dry cough during exam.    Past Medical History:        Diagnosis Date    Anxiety     Depression     Fibromyalgia     Hyperlipidemia     not currently.    OCD (obsessive compulsive disorder)        Past Surgical History:        Procedure Laterality Date    COLONOSCOPY      TUBAL LIGATION  1989       Current Medications:    Current Facility-Administered Medications: ipratropium 0.5 mg-albuterol 2.5 mg (DUONEB) nebulizer solution 1 Dose, 1 Dose, Inhalation, Q4H RT  methylPREDNISolone sodium succ (SOLU-MEDROL) injection 60 mg, 60 mg, IntraVENous, Q6H  HYDROcodone homatropine (HYCODAN) 5-1.5 MG/5ML solution 5 mL, 5 mL, Oral, Q6H PRN  sodium chloride flush 0.9 % injection 5-40 mL, 5-40 mL, IntraVENous, 2 times per day  sodium chloride flush 0.9 % injection 5-40 mL, 5-40 mL, IntraVENous, PRN  0.9 % sodium chloride infusion, , IntraVENous, PRN  ondansetron (ZOFRAN-ODT) disintegrating tablet 4 mg, 4 mg, Oral, Q8H PRN **OR** ondansetron (ZOFRAN) injection 4 mg, 4 mg, IntraVENous, Q6H PRN  magnesium hydroxide (MILK OF MAGNESIA) 400 MG/5ML suspension 30 mL, 30 mL, Oral, Daily PRN  enoxaparin (LOVENOX) injection 40 mg, 40 mg, SubCUTAneous, Daily  acetaminophen (TYLENOL) tablet 650 mg, 650 mg, Oral, Q6H PRN **OR** acetaminophen (TYLENOL) suppository  650 mg, 650 mg, Rectal, Q6H PRN  HYDROcodone-acetaminophen (NORCO) 5-325 MG per tablet 1 tablet, 1 tablet, Oral, TID PRN  clonazePAM (KLONOPIN) tablet 0.5 mg, 0.5 mg, Oral, BID PRN  DULoxetine (CYMBALTA) extended release capsule 60 mg, 60 mg, Oral, Daily  gabapentin (NEURONTIN) capsule 400 mg, 400 mg, Oral, 4x Daily  rosuvastatin (CRESTOR) tablet 10 mg, 10 mg, Oral, Nightly  albuterol (PROVENTIL) (2.5 MG/3ML) 0.083% nebulizer solution 2.5 mg, 2.5 mg, Nebulization, Q4H PRN  iopamidol (ISOVUE-370) 76 % injection 75 mL, 75 mL, IntraVENous, ONCE PRN    Allergies:  Darvon [propoxyphene] and Prozac [fluoxetine]    Social History:    TOBACCO:   reports that she quit smoking 4 days ago. Her smoking use included cigarettes. She started smoking about 49 years ago. She has a 49.3 pack-year smoking history. She has never used smokeless tobacco.    Family History:       Problem Relation Age of Onset    Cancer Mother     Dementia Mother     Alcohol Abuse Mother     Diabetes Father     Heart Attack Father     Other Sister         cerebral palsy    No Known Problems Brother     No Known Problems Half-Sister     No Known Problems Son     No Known Problems Son     No Known Problems Son     No Known Problems Son     No Known Problems Daughter        REVIEW OF SYSTEMS:    RESPIRATORY:  sob and cough  Remainder of complete ROS is negative.    PHYSICAL EXAM:      Vitals:    BP (!) 109/59   Pulse 78   Temp 98.7 °F (37.1 °C) (Oral)   Resp 20   Ht 1.6 m (5' 3\")   Wt 75.7 kg (166 lb 14.2 oz)   SpO2 94%   BMI 29.56 kg/m²     EYES:  Lids and lashes normal, pupils equal, round and reactive to light, extra ocular muscles intact, sclera clear, conjunctiva normal  ENT:  Normocephalic, without obvious abnormality, atraumatic, sinuses nontender on palpation, external ears without lesions, oral pharynx with moist mucus membranes, tonsils without erythema or exudates, gums normal and good dentition.  LUNGS:  bilateral ronchi and wheezing with

## 2024-11-25 NOTE — CARE COORDINATION
Social Work / Discharge PLanning :SW met with patient and explained role as discharge planner/ transition of care. Patient verified plan at discharge is HOME where she resides independently with spouse. Patient currently on 4 liters of 02 and NEW. Goal to wean to baseline RA. Patient does NOT have a nebulizer. Patient PCP is DR Tan Gutierres and she uses [facility]  in [place]. Patient does NOT have insurance listed However, patient stated they left early in the am to get here and did not grab cards. Patient states her insurance is [employer]. SW did update patient Public benefits will be calling her for insurance info. No DME preference if 02 and nebulizer needed at discharge. Await treatment plan and recommendations. SW to follow. Electronically signed by DANNA Pelayo on 11/25/24 at 9:45 AM EST

## 2024-11-25 NOTE — PROGRESS NOTES
11/25/24 0245   RT Protocol   History Pulmonary Disease 1   Respiratory pattern 4   Breath sounds 6   Cough 0   Indications for Bronchodilator Therapy Decreased or absent breath sounds;Wheezing associated with pulm disorder   Bronchodilator Assessment Score 11

## 2024-11-25 NOTE — PLAN OF CARE
Problem: Safety - Adult  Goal: Free from fall injury  11/25/2024 0745 by Awilda Aguilera, RN  Outcome: Progressing     Problem: Pain  Goal: Verbalizes/displays adequate comfort level or baseline comfort level  11/25/2024 0745 by Awilda Aguilera, RN  Outcome: Progressing

## 2024-11-26 LAB
ALBUMIN SERPL-MCNC: 3.9 G/DL (ref 3.5–5.2)
ALP SERPL-CCNC: 52 U/L (ref 35–104)
ALT SERPL-CCNC: 12 U/L (ref 0–32)
ANION GAP SERPL CALCULATED.3IONS-SCNC: 11 MMOL/L (ref 7–16)
AST SERPL-CCNC: 19 U/L (ref 0–31)
BASOPHILS # BLD: 0 K/UL (ref 0–0.2)
BASOPHILS NFR BLD: 0 % (ref 0–2)
BILIRUB SERPL-MCNC: 0.2 MG/DL (ref 0–1.2)
BUN SERPL-MCNC: 20 MG/DL (ref 6–23)
CALCIUM SERPL-MCNC: 9.1 MG/DL (ref 8.6–10.2)
CHLORIDE SERPL-SCNC: 102 MMOL/L (ref 98–107)
CO2 SERPL-SCNC: 23 MMOL/L (ref 22–29)
CREAT SERPL-MCNC: 0.8 MG/DL (ref 0.5–1)
EOSINOPHIL # BLD: 0 K/UL (ref 0.05–0.5)
EOSINOPHILS RELATIVE PERCENT: 0 % (ref 0–6)
ERYTHROCYTE [DISTWIDTH] IN BLOOD BY AUTOMATED COUNT: 13.5 % (ref 11.5–15)
GFR, ESTIMATED: 82 ML/MIN/1.73M2
GLUCOSE SERPL-MCNC: 137 MG/DL (ref 74–99)
HCT VFR BLD AUTO: 38.7 % (ref 34–48)
HGB BLD-MCNC: 12.1 G/DL (ref 11.5–15.5)
IMM GRANULOCYTES # BLD AUTO: 0.13 K/UL (ref 0–0.58)
IMM GRANULOCYTES NFR BLD: 1 % (ref 0–5)
LYMPHOCYTES NFR BLD: 0.79 K/UL (ref 1.5–4)
LYMPHOCYTES RELATIVE PERCENT: 7 % (ref 20–42)
MCH RBC QN AUTO: 31.8 PG (ref 26–35)
MCHC RBC AUTO-ENTMCNC: 31.3 G/DL (ref 32–34.5)
MCV RBC AUTO: 101.6 FL (ref 80–99.9)
MONOCYTES NFR BLD: 0.19 K/UL (ref 0.1–0.95)
MONOCYTES NFR BLD: 2 % (ref 2–12)
NEUTROPHILS NFR BLD: 90 % (ref 43–80)
NEUTS SEG NFR BLD: 9.62 K/UL (ref 1.8–7.3)
PLATELET # BLD AUTO: 268 K/UL (ref 130–450)
PMV BLD AUTO: 8.7 FL (ref 7–12)
POTASSIUM SERPL-SCNC: 4.2 MMOL/L (ref 3.5–5)
PROT SERPL-MCNC: 6.5 G/DL (ref 6.4–8.3)
RBC # BLD AUTO: 3.81 M/UL (ref 3.5–5.5)
SODIUM SERPL-SCNC: 136 MMOL/L (ref 132–146)
WBC OTHER # BLD: 10.7 K/UL (ref 4.5–11.5)

## 2024-11-26 PROCEDURE — 36415 COLL VENOUS BLD VENIPUNCTURE: CPT

## 2024-11-26 PROCEDURE — 6360000002 HC RX W HCPCS: Performed by: INTERNAL MEDICINE

## 2024-11-26 PROCEDURE — 2580000003 HC RX 258: Performed by: FAMILY MEDICINE

## 2024-11-26 PROCEDURE — 6370000000 HC RX 637 (ALT 250 FOR IP): Performed by: FAMILY MEDICINE

## 2024-11-26 PROCEDURE — 6360000002 HC RX W HCPCS: Performed by: FAMILY MEDICINE

## 2024-11-26 PROCEDURE — 6370000000 HC RX 637 (ALT 250 FOR IP): Performed by: INTERNAL MEDICINE

## 2024-11-26 PROCEDURE — 6360000002 HC RX W HCPCS

## 2024-11-26 PROCEDURE — 85025 COMPLETE CBC W/AUTO DIFF WBC: CPT

## 2024-11-26 PROCEDURE — 2700000000 HC OXYGEN THERAPY PER DAY

## 2024-11-26 PROCEDURE — 6370000000 HC RX 637 (ALT 250 FOR IP): Performed by: NURSE PRACTITIONER

## 2024-11-26 PROCEDURE — 2060000000 HC ICU INTERMEDIATE R&B

## 2024-11-26 PROCEDURE — 94640 AIRWAY INHALATION TREATMENT: CPT

## 2024-11-26 PROCEDURE — 94669 MECHANICAL CHEST WALL OSCILL: CPT

## 2024-11-26 PROCEDURE — 80053 COMPREHEN METABOLIC PANEL: CPT

## 2024-11-26 RX ORDER — BUDESONIDE 0.5 MG/2ML
1 INHALANT ORAL
Status: DISCONTINUED | OUTPATIENT
Start: 2024-11-26 | End: 2024-12-05 | Stop reason: HOSPADM

## 2024-11-26 RX ORDER — HYDROCODONE BITARTRATE AND ACETAMINOPHEN 5; 325 MG/1; MG/1
1 TABLET ORAL 3 TIMES DAILY PRN
Status: DISCONTINUED | OUTPATIENT
Start: 2024-11-26 | End: 2024-12-05 | Stop reason: HOSPADM

## 2024-11-26 RX ADMIN — ARFORMOTEROL TARTRATE 15 MCG: 15 SOLUTION RESPIRATORY (INHALATION) at 21:26

## 2024-11-26 RX ADMIN — IPRATROPIUM BROMIDE AND ALBUTEROL SULFATE 1 DOSE: 2.5; .5 SOLUTION RESPIRATORY (INHALATION) at 21:27

## 2024-11-26 RX ADMIN — IPRATROPIUM BROMIDE AND ALBUTEROL SULFATE 1 DOSE: 2.5; .5 SOLUTION RESPIRATORY (INHALATION) at 00:37

## 2024-11-26 RX ADMIN — ENOXAPARIN SODIUM 40 MG: 100 INJECTION SUBCUTANEOUS at 08:02

## 2024-11-26 RX ADMIN — METHYLPREDNISOLONE SODIUM SUCCINATE 60 MG: 125 INJECTION INTRAMUSCULAR; INTRAVENOUS at 04:51

## 2024-11-26 RX ADMIN — GABAPENTIN 400 MG: 400 CAPSULE ORAL at 13:00

## 2024-11-26 RX ADMIN — BUDESONIDE 500 MCG: 0.5 SUSPENSION RESPIRATORY (INHALATION) at 08:32

## 2024-11-26 RX ADMIN — HYDROCODONE BITARTRATE AND HOMATROPINE METHYLBROMIDE 5 ML: 5; 1.5 SOLUTION ORAL at 22:19

## 2024-11-26 RX ADMIN — METHYLPREDNISOLONE SODIUM SUCCINATE 60 MG: 125 INJECTION INTRAMUSCULAR; INTRAVENOUS at 11:37

## 2024-11-26 RX ADMIN — CLONAZEPAM 0.5 MG: 0.5 TABLET ORAL at 19:41

## 2024-11-26 RX ADMIN — IPRATROPIUM BROMIDE AND ALBUTEROL SULFATE 1 DOSE: 2.5; .5 SOLUTION RESPIRATORY (INHALATION) at 08:32

## 2024-11-26 RX ADMIN — GABAPENTIN 400 MG: 400 CAPSULE ORAL at 19:41

## 2024-11-26 RX ADMIN — METHYLPREDNISOLONE SODIUM SUCCINATE 60 MG: 125 INJECTION INTRAMUSCULAR; INTRAVENOUS at 16:59

## 2024-11-26 RX ADMIN — BUDESONIDE 1000 MCG: 0.5 SUSPENSION RESPIRATORY (INHALATION) at 21:27

## 2024-11-26 RX ADMIN — IPRATROPIUM BROMIDE AND ALBUTEROL SULFATE 1 DOSE: 2.5; .5 SOLUTION RESPIRATORY (INHALATION) at 15:42

## 2024-11-26 RX ADMIN — IPRATROPIUM BROMIDE AND ALBUTEROL SULFATE 1 DOSE: 2.5; .5 SOLUTION RESPIRATORY (INHALATION) at 12:53

## 2024-11-26 RX ADMIN — ARFORMOTEROL TARTRATE 15 MCG: 15 SOLUTION RESPIRATORY (INHALATION) at 08:32

## 2024-11-26 RX ADMIN — GABAPENTIN 400 MG: 400 CAPSULE ORAL at 08:02

## 2024-11-26 RX ADMIN — ROSUVASTATIN CALCIUM 10 MG: 10 TABLET, FILM COATED ORAL at 19:41

## 2024-11-26 RX ADMIN — HYDROCODONE BITARTRATE AND ACETAMINOPHEN 1 TABLET: 5; 325 TABLET ORAL at 13:00

## 2024-11-26 RX ADMIN — SODIUM CHLORIDE, PRESERVATIVE FREE 10 ML: 5 INJECTION INTRAVENOUS at 08:03

## 2024-11-26 RX ADMIN — DULOXETINE HYDROCHLORIDE 60 MG: 60 CAPSULE, DELAYED RELEASE ORAL at 08:02

## 2024-11-26 RX ADMIN — HYDROCODONE BITARTRATE AND HOMATROPINE METHYLBROMIDE 5 ML: 5; 1.5 SOLUTION ORAL at 11:40

## 2024-11-26 RX ADMIN — GABAPENTIN 400 MG: 400 CAPSULE ORAL at 17:00

## 2024-11-26 RX ADMIN — HYDROCODONE BITARTRATE AND HOMATROPINE METHYLBROMIDE 5 ML: 5; 1.5 SOLUTION ORAL at 04:51

## 2024-11-26 RX ADMIN — HYDROCODONE BITARTRATE AND ACETAMINOPHEN 1 TABLET: 5; 325 TABLET ORAL at 04:58

## 2024-11-26 RX ADMIN — SODIUM CHLORIDE, PRESERVATIVE FREE 10 ML: 5 INJECTION INTRAVENOUS at 19:42

## 2024-11-26 ASSESSMENT — PAIN DESCRIPTION - DESCRIPTORS
DESCRIPTORS: SORE;POUNDING
DESCRIPTORS: SORE;STABBING

## 2024-11-26 ASSESSMENT — PAIN DESCRIPTION - ORIENTATION
ORIENTATION: RIGHT;LEFT;MID
ORIENTATION: RIGHT;LEFT;MID;LOWER;INNER

## 2024-11-26 ASSESSMENT — PAIN SCALES - GENERAL
PAINLEVEL_OUTOF10: 3
PAINLEVEL_OUTOF10: 7
PAINLEVEL_OUTOF10: 7

## 2024-11-26 ASSESSMENT — PAIN DESCRIPTION - LOCATION
LOCATION: BACK;HEAD
LOCATION: ABDOMEN;BACK;LEG

## 2024-11-26 NOTE — PROGRESS NOTES
Pulmonary Progress Note    Admit Date: 2024  Hospital day                               PCP: Camryn Arriaga PA-C    Chief Complaint (s):  Patient Active Problem List   Diagnosis    Fibromyalgia    Anxiety    Chronic prescription opiate use    Smoker    Primary insomnia    Chronic pain syndrome [G89.4 (ICD-10-CM)]    Cervicalgia    Chronic bilateral low back pain without sciatica    COPD exacerbation (HCC)       Subjective:  Patient is seen on 3 L nasal cannula. She admits to ongoing dyspnea at rest, cough that is non productive, but congested.       Vitals:  VITALS:  /71   Pulse 74   Temp 98.2 °F (36.8 °C) (Oral)   Resp 16   Ht 1.6 m (5' 3\")   Wt 75.7 kg (166 lb 14.2 oz)   SpO2 94%   BMI 29.56 kg/m²     24HR INTAKE/OUTPUT:      Intake/Output Summary (Last 24 hours) at 2024 1127  Last data filed at 2024 1003  Gross per 24 hour   Intake 660 ml   Output --   Net 660 ml       24HR PULSE OXIMETRY RANGE:    SpO2  Av.3 %  Min: 94 %  Max: 95 %    Medications:  IV:   sodium chloride         Scheduled Meds:   budesonide  1 mg Nebulization BID RT    ipratropium 0.5 mg-albuterol 2.5 mg  1 Dose Inhalation Q4H RT    methylPREDNISolone  60 mg IntraVENous Q6H    arformoterol tartrate  15 mcg Nebulization BID RT    sodium chloride flush  5-40 mL IntraVENous 2 times per day    enoxaparin  40 mg SubCUTAneous Daily    DULoxetine  60 mg Oral Daily    gabapentin  400 mg Oral 4x Daily    rosuvastatin  10 mg Oral Nightly       Diet:   ADULT DIET; Regular; Low Fat/Low Chol/High Fiber/ANA     EXAM:  General: No distress. Alert.  Eyes: PERRL. No sclera icterus. No conjunctival injection.  ENT: No discharge. Pharynx clear.  Neck: Trachea midline. Normal thyroid.  Resp: No accessory muscle use. bilateral rales. no wheezing. diffuse rhonchi.   CV: Regular rate. Regular rhythm. No murmur or rub.   Abd: Non-tender. Non-distended. No masses. No organomegaly. Normal bowel sounds.   Skin: Warm and  dry. No nodule on exposed extremities. No rash on exposed extremities.  Ext: No cyanosis, clubbing, edema  Lymph: No cervical LAD. No supraclavicular LAD.   M/S: No cyanosis. No joint deformity. No clubbing.   Neuro: Awake. Follows commands. Positive pupils/gag/corneals. Normal pain response.       Results:  CBC:   Recent Labs     11/24/24 0345 11/25/24 0748 11/26/24 0412   WBC 7.2 12.6* 10.7   HGB 13.1 12.4 12.1   HCT 40.6 38.6 38.7   MCV 99.0 99.7 101.6*    260 268     BMP:   Recent Labs     11/24/24 0345 11/25/24 0748 11/26/24 0412    136 136   K 4.4 4.5 4.2   * 103 102   CO2 25 23 23   BUN 11 14 20   CREATININE 0.8 0.7 0.8     LIVER PROFILE:   Recent Labs     11/24/24 0345 11/25/24 0748 11/26/24 0412   AST 19 17 19   ALT 12 12 12   BILITOT 0.3 0.2 0.2   ALKPHOS 63 55 52     PT/INR: No results for input(s): \"PROTIME\", \"INR\" in the last 72 hours.  APTT: No results for input(s): \"APTT\" in the last 72 hours.      Pathology:  N/A      Microbiology:  None    Recent ABG:   No results for input(s): \"PH\", \"PO2\", \"PCO2\", \"HCO3\", \"BE\", \"O2SAT\", \"METHB\", \"O2HB\", \"COHB\", \"O2CON\", \"HHB\", \"THB\" in the last 72 hours.          Recent Films:  CTA PULMONARY W CONTRAST   Final Result   Addendum (preliminary) 1 of 1   ADDENDUM:   Findings discussed with SERGEI Wang on 11/24/2024 at 5:30 p.m..         Final   No evidence of pulmonary embolism or acute pulmonary abnormality.            CT CHEST W CONTRAST   Final Result   Addendum (preliminary) 1 of 1   ADDENDUM:   Possible very small focus of thrombus in the pulmonary artery branch    vessels   to both lower lobes.  There is suboptimal opacification of the pulmonary   arteries.  Recommend repeat study.  Findings discussed with Blaine are and    at   3:44 p.m. on 11/24/2024.         Final   No evidence for pneumonia.  The lungs are clear.  No pleural effusions.            XR CHEST PORTABLE   Final Result   No acute cardiopulmonary disease.

## 2024-11-26 NOTE — PROGRESS NOTES
[facility]                                Progress note    Subjective:    Patient resting comfortably in bed.  States that her breathing has improved significantly however still coughing with audible wheezing    Objective:    /65   Pulse 77   Temp 97.7 °F (36.5 °C) (Axillary)   Resp 18   Ht 1.6 m (5' 3\")   Wt 75.7 kg (166 lb 14.2 oz)   SpO2 94%   BMI 29.56 kg/m²     In: 840 [P.O.:840]  Out: -   In: 840   Out: -     General appearance: NAD, conversant, appears to be working hard to rate on my evaluation today  Eyes: Sclerae anicteric, PERRLA  HEENT: AT/NC, MMM  Neck: FROM, supple, no thyromegaly  Lymph: No cervical / supraclavicular lymphadenopathy  Lungs: Coarse breath sounds bilateral lung field, significant wheeze  CV: RRR, no MRGs, no lower extremity edema  Abdomen: Soft, non-tender; no masses or HSM, +BS  Extremities: FROM without synovitis.  No clubbing or cyanosis of the hands.  Skin: no rash, induration, lesions, or ulcers  Psych: Calm and cooperative.  Normal judgement and insight.  Normal mood and affect.  Neuro: Alert and interactive, face symmetric, speech fluent.     Recent Labs     11/24/24  0345 11/25/24  0748 11/26/24  0412   WBC 7.2 12.6* 10.7   HGB 13.1 12.4 12.1   HCT 40.6 38.6 38.7    260 268       Recent Labs     11/24/24  0345 11/25/24  0748 11/26/24  0412    136 136   K 4.4 4.5 4.2   * 103 102   CO2 25 23 23   BUN 11 14 20   CREATININE 0.8 0.7 0.8   CALCIUM 9.1 9.2 9.1       Assessment:    Principal Problem:    COPD exacerbation (HCC)  Resolved Problems:    * No resolved hospital problems. *      Plan:    Patient is a 64-year-old female admitted to [facility] for  COPD exacerbation.  -Monitor labs  -proBNP 83  -Respiratory panel negative  -D-dimer 586  -Increase IV Solu-Medrol 40 mg every 6 hours-  -Consult pulmonology, she needs to be established with a lung doctor as she has longstanding COPD and coarse breath sounds, impending respiratory

## 2024-11-26 NOTE — PROGRESS NOTES
Oxygen saturation was 94% on 4L at rest.   Oxygen saturation on 4L while ambulating was 88%  Recovery pulse ox was 92% on 4.5 liters of oxygen while ambulating     Electronically signed by Awilda Aguilera RN on 11/26/2024 at 12:47 PM

## 2024-11-26 NOTE — PLAN OF CARE
Problem: Safety - Adult  Goal: Free from fall injury  11/26/2024 0951 by Awilda Aguilera, RN  Outcome: Progressing     Problem: Pain  Goal: Verbalizes/displays adequate comfort level or baseline comfort level  11/26/2024 0951 by Awilda Aguilera, RN  Outcome: Progressing

## 2024-11-26 NOTE — PROGRESS NOTES
[facility] Quality Flow/Interdisciplinary Rounds Progress Note        Quality Flow Rounds held on November 26, 2024    Disciplines Attending:  Bedside Nurse, , , and Nursing Unit Leadership    Tiffany Valentine was admitted on 11/24/2024  3:42 AM    Anticipated Discharge Date:       Disposition:    Jose Score:  Jose Scale Score: 23    Readmission Risk              Risk of Unplanned Readmission:  10           Discussed patient goal for the day, patient clinical progression, and barriers to discharge.  The following Goal(s) of the Day/Commitment(s) have been identified:   iv steroids, nebs, wean oxygen       Iesha Estrada RN  November 26, 2024

## 2024-11-26 NOTE — CARE COORDINATION
Social Work / Discharge Planning :Pulmonology consulted. Patient remains on 4 liters of 02. No DME preference and referral to Dorina at [facility] ( who is a [facility] provider )  for  potential 02 and nebulizer if patient cannot be weaned back to BL RA. Will need a walking pulse ox and if it supports HOME 02, will need a DME order with qualifying dx. Dorina from [facility] will need called :[phone] to follow. Electronically signed by DANNA Pelayo on 11/26/24 at 10:42 AM EST

## 2024-11-27 LAB
ALBUMIN SERPL-MCNC: 4 G/DL (ref 3.5–5.2)
ALP SERPL-CCNC: 50 U/L (ref 35–104)
ALT SERPL-CCNC: 14 U/L (ref 0–32)
ANION GAP SERPL CALCULATED.3IONS-SCNC: 11 MMOL/L (ref 7–16)
AST SERPL-CCNC: 20 U/L (ref 0–31)
BASOPHILS # BLD: 0 K/UL (ref 0–0.2)
BASOPHILS NFR BLD: 0 % (ref 0–2)
BILIRUB SERPL-MCNC: 0.2 MG/DL (ref 0–1.2)
BUN SERPL-MCNC: 16 MG/DL (ref 6–23)
CALCIUM SERPL-MCNC: 8.8 MG/DL (ref 8.6–10.2)
CHLORIDE SERPL-SCNC: 100 MMOL/L (ref 98–107)
CO2 SERPL-SCNC: 26 MMOL/L (ref 22–29)
CREAT SERPL-MCNC: 0.7 MG/DL (ref 0.5–1)
EOSINOPHIL # BLD: 0 K/UL (ref 0.05–0.5)
EOSINOPHILS RELATIVE PERCENT: 0 % (ref 0–6)
ERYTHROCYTE [DISTWIDTH] IN BLOOD BY AUTOMATED COUNT: 13.3 % (ref 11.5–15)
GFR, ESTIMATED: >90 ML/MIN/1.73M2
GLUCOSE SERPL-MCNC: 157 MG/DL (ref 74–99)
HCT VFR BLD AUTO: 37.4 % (ref 34–48)
HGB BLD-MCNC: 11.7 G/DL (ref 11.5–15.5)
LYMPHOCYTES NFR BLD: 0.08 K/UL (ref 1.5–4)
LYMPHOCYTES RELATIVE PERCENT: 1 % (ref 20–42)
MCH RBC QN AUTO: 31.5 PG (ref 26–35)
MCHC RBC AUTO-ENTMCNC: 31.3 G/DL (ref 32–34.5)
MCV RBC AUTO: 100.5 FL (ref 80–99.9)
MONOCYTES NFR BLD: 0.32 K/UL (ref 0.1–0.95)
MONOCYTES NFR BLD: 4 % (ref 2–12)
NEUTROPHILS NFR BLD: 96 % (ref 43–80)
NEUTS SEG NFR BLD: 8.61 K/UL (ref 1.8–7.3)
PLATELET # BLD AUTO: 252 K/UL (ref 130–450)
PMV BLD AUTO: 8.7 FL (ref 7–12)
POTASSIUM SERPL-SCNC: 4 MMOL/L (ref 3.5–5)
PROT SERPL-MCNC: 6.2 G/DL (ref 6.4–8.3)
RBC # BLD AUTO: 3.72 M/UL (ref 3.5–5.5)
RBC # BLD: ABNORMAL 10*6/UL
SODIUM SERPL-SCNC: 137 MMOL/L (ref 132–146)
WBC OTHER # BLD: 9 K/UL (ref 4.5–11.5)

## 2024-11-27 PROCEDURE — 6370000000 HC RX 637 (ALT 250 FOR IP): Performed by: INTERNAL MEDICINE

## 2024-11-27 PROCEDURE — 36415 COLL VENOUS BLD VENIPUNCTURE: CPT

## 2024-11-27 PROCEDURE — 6360000002 HC RX W HCPCS: Performed by: FAMILY MEDICINE

## 2024-11-27 PROCEDURE — 6360000002 HC RX W HCPCS: Performed by: INTERNAL MEDICINE

## 2024-11-27 PROCEDURE — 6360000002 HC RX W HCPCS

## 2024-11-27 PROCEDURE — 2580000003 HC RX 258: Performed by: FAMILY MEDICINE

## 2024-11-27 PROCEDURE — 2060000000 HC ICU INTERMEDIATE R&B

## 2024-11-27 PROCEDURE — 94640 AIRWAY INHALATION TREATMENT: CPT

## 2024-11-27 PROCEDURE — 85025 COMPLETE CBC W/AUTO DIFF WBC: CPT

## 2024-11-27 PROCEDURE — 94669 MECHANICAL CHEST WALL OSCILL: CPT

## 2024-11-27 PROCEDURE — 80053 COMPREHEN METABOLIC PANEL: CPT

## 2024-11-27 PROCEDURE — 2580000003 HC RX 258

## 2024-11-27 PROCEDURE — 6370000000 HC RX 637 (ALT 250 FOR IP): Performed by: NURSE PRACTITIONER

## 2024-11-27 PROCEDURE — 2700000000 HC OXYGEN THERAPY PER DAY

## 2024-11-27 PROCEDURE — 6370000000 HC RX 637 (ALT 250 FOR IP): Performed by: FAMILY MEDICINE

## 2024-11-27 RX ORDER — WATER 10 ML/10ML
INJECTION INTRAMUSCULAR; INTRAVENOUS; SUBCUTANEOUS
Status: COMPLETED
Start: 2024-11-27 | End: 2024-11-27

## 2024-11-27 RX ADMIN — ENOXAPARIN SODIUM 40 MG: 100 INJECTION SUBCUTANEOUS at 09:23

## 2024-11-27 RX ADMIN — IPRATROPIUM BROMIDE AND ALBUTEROL SULFATE 1 DOSE: 2.5; .5 SOLUTION RESPIRATORY (INHALATION) at 23:50

## 2024-11-27 RX ADMIN — HYDROCODONE BITARTRATE AND ACETAMINOPHEN 1 TABLET: 5; 325 TABLET ORAL at 01:48

## 2024-11-27 RX ADMIN — HYDROCODONE BITARTRATE AND HOMATROPINE METHYLBROMIDE 5 ML: 5; 1.5 SOLUTION ORAL at 05:35

## 2024-11-27 RX ADMIN — IPRATROPIUM BROMIDE AND ALBUTEROL SULFATE 1 DOSE: 2.5; .5 SOLUTION RESPIRATORY (INHALATION) at 15:52

## 2024-11-27 RX ADMIN — HYDROCODONE BITARTRATE AND HOMATROPINE METHYLBROMIDE 5 ML: 5; 1.5 SOLUTION ORAL at 11:45

## 2024-11-27 RX ADMIN — HYDROCODONE BITARTRATE AND HOMATROPINE METHYLBROMIDE 5 ML: 5; 1.5 SOLUTION ORAL at 18:16

## 2024-11-27 RX ADMIN — GABAPENTIN 400 MG: 400 CAPSULE ORAL at 17:24

## 2024-11-27 RX ADMIN — IPRATROPIUM BROMIDE AND ALBUTEROL SULFATE 1 DOSE: 2.5; .5 SOLUTION RESPIRATORY (INHALATION) at 00:36

## 2024-11-27 RX ADMIN — IPRATROPIUM BROMIDE AND ALBUTEROL SULFATE 1 DOSE: 2.5; .5 SOLUTION RESPIRATORY (INHALATION) at 08:55

## 2024-11-27 RX ADMIN — DULOXETINE HYDROCHLORIDE 60 MG: 60 CAPSULE, DELAYED RELEASE ORAL at 09:25

## 2024-11-27 RX ADMIN — BUDESONIDE 1000 MCG: 0.5 SUSPENSION RESPIRATORY (INHALATION) at 08:55

## 2024-11-27 RX ADMIN — METHYLPREDNISOLONE SODIUM SUCCINATE 60 MG: 125 INJECTION INTRAMUSCULAR; INTRAVENOUS at 00:03

## 2024-11-27 RX ADMIN — METHYLPREDNISOLONE SODIUM SUCCINATE 60 MG: 125 INJECTION INTRAMUSCULAR; INTRAVENOUS at 05:35

## 2024-11-27 RX ADMIN — SODIUM CHLORIDE, PRESERVATIVE FREE 10 ML: 5 INJECTION INTRAVENOUS at 09:26

## 2024-11-27 RX ADMIN — GABAPENTIN 400 MG: 400 CAPSULE ORAL at 21:07

## 2024-11-27 RX ADMIN — IPRATROPIUM BROMIDE AND ALBUTEROL SULFATE 1 DOSE: 2.5; .5 SOLUTION RESPIRATORY (INHALATION) at 13:14

## 2024-11-27 RX ADMIN — ROSUVASTATIN CALCIUM 10 MG: 10 TABLET, FILM COATED ORAL at 21:07

## 2024-11-27 RX ADMIN — WATER 2 ML: 1 INJECTION INTRAMUSCULAR; INTRAVENOUS; SUBCUTANEOUS at 05:38

## 2024-11-27 RX ADMIN — METHYLPREDNISOLONE SODIUM SUCCINATE 60 MG: 125 INJECTION INTRAMUSCULAR; INTRAVENOUS at 23:58

## 2024-11-27 RX ADMIN — ARFORMOTEROL TARTRATE 15 MCG: 15 SOLUTION RESPIRATORY (INHALATION) at 19:34

## 2024-11-27 RX ADMIN — IPRATROPIUM BROMIDE AND ALBUTEROL SULFATE 1 DOSE: 2.5; .5 SOLUTION RESPIRATORY (INHALATION) at 19:34

## 2024-11-27 RX ADMIN — HYDROCODONE BITARTRATE AND ACETAMINOPHEN 1 TABLET: 5; 325 TABLET ORAL at 13:48

## 2024-11-27 RX ADMIN — SODIUM CHLORIDE, PRESERVATIVE FREE 10 ML: 5 INJECTION INTRAVENOUS at 21:07

## 2024-11-27 RX ADMIN — METHYLPREDNISOLONE SODIUM SUCCINATE 60 MG: 125 INJECTION INTRAMUSCULAR; INTRAVENOUS at 17:22

## 2024-11-27 RX ADMIN — BUDESONIDE 1000 MCG: 0.5 SUSPENSION RESPIRATORY (INHALATION) at 19:34

## 2024-11-27 RX ADMIN — ARFORMOTEROL TARTRATE 15 MCG: 15 SOLUTION RESPIRATORY (INHALATION) at 08:55

## 2024-11-27 RX ADMIN — METHYLPREDNISOLONE SODIUM SUCCINATE 60 MG: 125 INJECTION INTRAMUSCULAR; INTRAVENOUS at 09:23

## 2024-11-27 RX ADMIN — IPRATROPIUM BROMIDE AND ALBUTEROL SULFATE 1 DOSE: 2.5; .5 SOLUTION RESPIRATORY (INHALATION) at 03:54

## 2024-11-27 RX ADMIN — GABAPENTIN 400 MG: 400 CAPSULE ORAL at 09:25

## 2024-11-27 RX ADMIN — GABAPENTIN 400 MG: 400 CAPSULE ORAL at 13:48

## 2024-11-27 ASSESSMENT — PAIN SCALES - GENERAL
PAINLEVEL_OUTOF10: 0
PAINLEVEL_OUTOF10: 8
PAINLEVEL_OUTOF10: 7

## 2024-11-27 ASSESSMENT — PAIN SCALES - WONG BAKER: WONGBAKER_NUMERICALRESPONSE: NO HURT

## 2024-11-27 ASSESSMENT — PAIN DESCRIPTION - LOCATION
LOCATION: BACK;ABDOMEN
LOCATION: LEG

## 2024-11-27 ASSESSMENT — PAIN DESCRIPTION - DESCRIPTORS: DESCRIPTORS: DISCOMFORT

## 2024-11-27 ASSESSMENT — PAIN DESCRIPTION - ORIENTATION: ORIENTATION: RIGHT;LEFT

## 2024-11-27 NOTE — PROGRESS NOTES
[facility]                                Progress note    Subjective:    The patient is resting comfortably in bed   States she still feels very short of breath   Remains on 4 NC    Objective:    /66   Pulse 78   Temp 98.1 °F (36.7 °C) (Oral)   Resp 16   Ht 1.6 m (5' 3\")   Wt 75.7 kg (166 lb 14.2 oz)   SpO2 92%   BMI 29.56 kg/m²     In: 420 [P.O.:420]  Out: -   In: 420   Out: -     General appearance: NAD, conversant, appears to be working hard to rate on my evaluation today  Eyes: Sclerae anicteric, PERRLA  HEENT: AT/NC, MMM  Neck: FROM, supple, no thyromegaly  Lymph: No cervical / supraclavicular lymphadenopathy  Lungs: Coarse breath sounds bilateral lung field, significant wheeze, 4L  CV: RRR, no MRGs, no lower extremity edema  Abdomen: Soft, non-tender; no masses or HSM, +BS  Extremities: FROM without synovitis.  No clubbing or cyanosis of the hands.  Skin: no rash, induration, lesions, or ulcers  Psych: Calm and cooperative.  Normal judgement and insight.  Normal mood and affect.  Neuro: Alert and interactive, face symmetric, speech fluent.     Recent Labs     11/25/24  0748 11/26/24  0412 11/27/24  0508   WBC 12.6* 10.7 9.0   HGB 12.4 12.1 11.7   HCT 38.6 38.7 37.4    268 252       Recent Labs     11/25/24  0748 11/26/24  0412 11/27/24  0508    136 137   K 4.5 4.2 4.0    102 100   CO2 23 23 26   BUN 14 20 16   CREATININE 0.7 0.8 0.7   CALCIUM 9.2 9.1 8.8       Assessment:    Principal Problem:    COPD exacerbation (HCC)  Resolved Problems:    * No resolved hospital problems. *      Plan:     Patient is a 64-year-old female admitted to [facility] for  COPD exacerbation.  -Monitor labs  -proBNP 83  -Respiratory panel negative  -D-dimer 586  -Increase IV Solu-Medrol 40 mg every 6 hours-  -Consult pulmonology, she needs to be established with a lung doctor as she has longstanding COPD and coarse breath sounds, impending respiratory failure acutely  -Scheduled

## 2024-11-27 NOTE — PROGRESS NOTES
[facility] Quality Flow/Interdisciplinary Rounds Progress Note        Quality Flow Rounds held on November 27, 2024    Disciplines Attending:  Bedside Nurse, , , and Nursing Unit Leadership    Tiffany Valentine was admitted on 11/24/2024  3:42 AM    Anticipated Discharge Date:  Expected Discharge Date: 11/29/24    Disposition:    Jose Score:  Jose Scale Score: 23    Readmission Risk              Risk of Unplanned Readmission:  11           Discussed patient goal for the day, patient clinical progression, and barriers to discharge.  The following Goal(s) of the Day/Commitment(s) have been identified:   iv steroids, wean oxygen as tolerated, monitor resp status.       Iesha Estrada RN  November 27, 2024

## 2024-11-28 LAB
ANION GAP SERPL CALCULATED.3IONS-SCNC: 9 MMOL/L (ref 7–16)
BASOPHILS # BLD: 0 K/UL (ref 0–0.2)
BASOPHILS NFR BLD: 0 % (ref 0–2)
BUN SERPL-MCNC: 15 MG/DL (ref 6–23)
CALCIUM SERPL-MCNC: 8.7 MG/DL (ref 8.6–10.2)
CHLORIDE SERPL-SCNC: 101 MMOL/L (ref 98–107)
CO2 SERPL-SCNC: 26 MMOL/L (ref 22–29)
CREAT SERPL-MCNC: 0.7 MG/DL (ref 0.5–1)
EOSINOPHIL # BLD: 0 K/UL (ref 0.05–0.5)
EOSINOPHILS RELATIVE PERCENT: 0 % (ref 0–6)
ERYTHROCYTE [DISTWIDTH] IN BLOOD BY AUTOMATED COUNT: 13.1 % (ref 11.5–15)
GFR, ESTIMATED: >90 ML/MIN/1.73M2
GLUCOSE SERPL-MCNC: 145 MG/DL (ref 74–99)
HCT VFR BLD AUTO: 34.8 % (ref 34–48)
HGB BLD-MCNC: 11.4 G/DL (ref 11.5–15.5)
LYMPHOCYTES NFR BLD: 0.23 K/UL (ref 1.5–4)
LYMPHOCYTES RELATIVE PERCENT: 3 % (ref 20–42)
MCH RBC QN AUTO: 32.1 PG (ref 26–35)
MCHC RBC AUTO-ENTMCNC: 32.8 G/DL (ref 32–34.5)
MCV RBC AUTO: 98 FL (ref 80–99.9)
MONOCYTES NFR BLD: 0.31 K/UL (ref 0.1–0.95)
MONOCYTES NFR BLD: 4 % (ref 2–12)
NEUTROPHILS NFR BLD: 94 % (ref 43–80)
NEUTS SEG NFR BLD: 8.35 K/UL (ref 1.8–7.3)
NUCLEATED RED BLOOD CELLS: 1 PER 100 WBC
PLATELET # BLD AUTO: 222 K/UL (ref 130–450)
PMV BLD AUTO: 8.4 FL (ref 7–12)
POTASSIUM SERPL-SCNC: 4.1 MMOL/L (ref 3.5–5)
RBC # BLD AUTO: 3.55 M/UL (ref 3.5–5.5)
RBC # BLD: ABNORMAL 10*6/UL
RBC # BLD: ABNORMAL 10*6/UL
SODIUM SERPL-SCNC: 136 MMOL/L (ref 132–146)
WBC OTHER # BLD: 8.9 K/UL (ref 4.5–11.5)

## 2024-11-28 PROCEDURE — 94669 MECHANICAL CHEST WALL OSCILL: CPT

## 2024-11-28 PROCEDURE — 6360000002 HC RX W HCPCS: Performed by: INTERNAL MEDICINE

## 2024-11-28 PROCEDURE — 6370000000 HC RX 637 (ALT 250 FOR IP): Performed by: INTERNAL MEDICINE

## 2024-11-28 PROCEDURE — 2060000000 HC ICU INTERMEDIATE R&B

## 2024-11-28 PROCEDURE — 2700000000 HC OXYGEN THERAPY PER DAY

## 2024-11-28 PROCEDURE — 85025 COMPLETE CBC W/AUTO DIFF WBC: CPT

## 2024-11-28 PROCEDURE — 6360000002 HC RX W HCPCS: Performed by: FAMILY MEDICINE

## 2024-11-28 PROCEDURE — 6370000000 HC RX 637 (ALT 250 FOR IP): Performed by: NURSE PRACTITIONER

## 2024-11-28 PROCEDURE — 6360000002 HC RX W HCPCS

## 2024-11-28 PROCEDURE — 2580000003 HC RX 258: Performed by: FAMILY MEDICINE

## 2024-11-28 PROCEDURE — 6370000000 HC RX 637 (ALT 250 FOR IP): Performed by: FAMILY MEDICINE

## 2024-11-28 PROCEDURE — 80048 BASIC METABOLIC PNL TOTAL CA: CPT

## 2024-11-28 PROCEDURE — 94640 AIRWAY INHALATION TREATMENT: CPT

## 2024-11-28 RX ADMIN — BUDESONIDE 1000 MCG: 0.5 SUSPENSION RESPIRATORY (INHALATION) at 08:31

## 2024-11-28 RX ADMIN — METHYLPREDNISOLONE SODIUM SUCCINATE 60 MG: 125 INJECTION INTRAMUSCULAR; INTRAVENOUS at 22:10

## 2024-11-28 RX ADMIN — GABAPENTIN 400 MG: 400 CAPSULE ORAL at 12:16

## 2024-11-28 RX ADMIN — METHYLPREDNISOLONE SODIUM SUCCINATE 60 MG: 125 INJECTION INTRAMUSCULAR; INTRAVENOUS at 05:36

## 2024-11-28 RX ADMIN — GABAPENTIN 400 MG: 400 CAPSULE ORAL at 22:14

## 2024-11-28 RX ADMIN — HYDROCODONE BITARTRATE AND HOMATROPINE METHYLBROMIDE 5 ML: 5; 1.5 SOLUTION ORAL at 09:31

## 2024-11-28 RX ADMIN — METHYLPREDNISOLONE SODIUM SUCCINATE 60 MG: 125 INJECTION INTRAMUSCULAR; INTRAVENOUS at 16:22

## 2024-11-28 RX ADMIN — ARFORMOTEROL TARTRATE 15 MCG: 15 SOLUTION RESPIRATORY (INHALATION) at 08:31

## 2024-11-28 RX ADMIN — HYDROCODONE BITARTRATE AND ACETAMINOPHEN 1 TABLET: 5; 325 TABLET ORAL at 09:32

## 2024-11-28 RX ADMIN — DULOXETINE HYDROCHLORIDE 60 MG: 60 CAPSULE, DELAYED RELEASE ORAL at 09:32

## 2024-11-28 RX ADMIN — IPRATROPIUM BROMIDE AND ALBUTEROL SULFATE 1 DOSE: 2.5; .5 SOLUTION RESPIRATORY (INHALATION) at 13:01

## 2024-11-28 RX ADMIN — HYDROCODONE BITARTRATE AND HOMATROPINE METHYLBROMIDE 5 ML: 5; 1.5 SOLUTION ORAL at 00:04

## 2024-11-28 RX ADMIN — CLONAZEPAM 0.5 MG: 0.5 TABLET ORAL at 12:16

## 2024-11-28 RX ADMIN — METHYLPREDNISOLONE SODIUM SUCCINATE 60 MG: 125 INJECTION INTRAMUSCULAR; INTRAVENOUS at 09:28

## 2024-11-28 RX ADMIN — ROSUVASTATIN CALCIUM 10 MG: 10 TABLET, FILM COATED ORAL at 22:14

## 2024-11-28 RX ADMIN — SODIUM CHLORIDE, PRESERVATIVE FREE 10 ML: 5 INJECTION INTRAVENOUS at 09:32

## 2024-11-28 RX ADMIN — GABAPENTIN 400 MG: 400 CAPSULE ORAL at 16:22

## 2024-11-28 RX ADMIN — IPRATROPIUM BROMIDE AND ALBUTEROL SULFATE 1 DOSE: 2.5; .5 SOLUTION RESPIRATORY (INHALATION) at 20:24

## 2024-11-28 RX ADMIN — ARFORMOTEROL TARTRATE 15 MCG: 15 SOLUTION RESPIRATORY (INHALATION) at 20:24

## 2024-11-28 RX ADMIN — IPRATROPIUM BROMIDE AND ALBUTEROL SULFATE 1 DOSE: 2.5; .5 SOLUTION RESPIRATORY (INHALATION) at 16:09

## 2024-11-28 RX ADMIN — HYDROCODONE BITARTRATE AND HOMATROPINE METHYLBROMIDE 5 ML: 5; 1.5 SOLUTION ORAL at 16:52

## 2024-11-28 RX ADMIN — BUDESONIDE 1000 MCG: 0.5 SUSPENSION RESPIRATORY (INHALATION) at 20:24

## 2024-11-28 RX ADMIN — SODIUM CHLORIDE, PRESERVATIVE FREE 5 ML: 5 INJECTION INTRAVENOUS at 22:00

## 2024-11-28 RX ADMIN — GABAPENTIN 400 MG: 400 CAPSULE ORAL at 09:32

## 2024-11-28 RX ADMIN — IPRATROPIUM BROMIDE AND ALBUTEROL SULFATE 1 DOSE: 2.5; .5 SOLUTION RESPIRATORY (INHALATION) at 08:31

## 2024-11-28 RX ADMIN — IPRATROPIUM BROMIDE AND ALBUTEROL SULFATE 1 DOSE: 2.5; .5 SOLUTION RESPIRATORY (INHALATION) at 04:14

## 2024-11-28 RX ADMIN — CLONAZEPAM 0.5 MG: 0.5 TABLET ORAL at 02:40

## 2024-11-28 RX ADMIN — ENOXAPARIN SODIUM 40 MG: 100 INJECTION SUBCUTANEOUS at 09:27

## 2024-11-28 ASSESSMENT — PAIN SCALES - WONG BAKER
WONGBAKER_NUMERICALRESPONSE: NO HURT

## 2024-11-28 ASSESSMENT — PAIN - FUNCTIONAL ASSESSMENT: PAIN_FUNCTIONAL_ASSESSMENT: ACTIVITIES ARE NOT PREVENTED

## 2024-11-28 ASSESSMENT — PAIN DESCRIPTION - DESCRIPTORS: DESCRIPTORS: SORE

## 2024-11-28 ASSESSMENT — PAIN SCALES - GENERAL
PAINLEVEL_OUTOF10: 0
PAINLEVEL_OUTOF10: 8

## 2024-11-28 ASSESSMENT — PAIN DESCRIPTION - ORIENTATION: ORIENTATION: MID

## 2024-11-28 ASSESSMENT — PAIN DESCRIPTION - LOCATION: LOCATION: ABDOMEN

## 2024-11-28 NOTE — PROGRESS NOTES
Pulmonary Progress Note    Admit Date: 2024  Hospital day                               PCP: Camryn Arriaga PA-C    Chief Complaint (s):  Patient Active Problem List   Diagnosis    Fibromyalgia    Anxiety    Chronic prescription opiate use    Smoker    Primary insomnia    Chronic pain syndrome [G89.4 (ICD-10-CM)]    Cervicalgia    Chronic bilateral low back pain without sciatica    COPD exacerbation (HCC)       Subjective:  Seen this p.m., the patient is still struggling with secretions.      Vitals:  VITALS:  BP (!) 121/58   Pulse 76   Temp 97.7 °F (36.5 °C) (Oral)   Resp 20   Ht 1.6 m (5' 3\")   Wt 76.6 kg (168 lb 14 oz)   SpO2 93%   BMI 29.91 kg/m²     24HR INTAKE/OUTPUT:      Intake/Output Summary (Last 24 hours) at 2024 1452  Last data filed at 2024 0922  Gross per 24 hour   Intake 240 ml   Output --   Net 240 ml       24HR PULSE OXIMETRY RANGE:    SpO2  Av %  Min: 90 %  Max: 94 %    Medications:  IV:   sodium chloride         Scheduled Meds:   budesonide  1 mg Nebulization BID RT    ipratropium 0.5 mg-albuterol 2.5 mg  1 Dose Inhalation Q4H RT    methylPREDNISolone  60 mg IntraVENous Q6H    arformoterol tartrate  15 mcg Nebulization BID RT    sodium chloride flush  5-40 mL IntraVENous 2 times per day    enoxaparin  40 mg SubCUTAneous Daily    DULoxetine  60 mg Oral Daily    gabapentin  400 mg Oral 4x Daily    rosuvastatin  10 mg Oral Nightly       Diet:   ADULT DIET; Regular; Low Fat/Low Chol/High Fiber/ANA     EXAM:  General: No distress. Alert.  Eyes: PERRL. No sclera icterus. No conjunctival injection.  ENT: No discharge. Pharynx clear.  Neck: Trachea midline. Normal thyroid.  Resp: No accessory muscle use. bilateral rales. no wheezing. diffuse rhonchi.   CV: Regular rate. Regular rhythm. No murmur or rub.   Abd: Non-tender. Non-distended. No masses. No organomegaly. Normal bowel sounds.   Skin: Warm and dry. No nodule on exposed extremities. No rash on exposed

## 2024-11-28 NOTE — PROGRESS NOTES
[facility] Quality Flow/Interdisciplinary Rounds Progress Note        Quality Flow Rounds held on November 28, 2024    Disciplines Attending:  Bedside Nurse, , , and Nursing Unit Leadership    Tiffany Valentine was admitted on 11/24/2024  3:42 AM    Anticipated Discharge Date:  Expected Discharge Date: 11/29/24    Disposition:    Jose Score:  Jose Scale Score: 20    Readmission Risk              Risk of Unplanned Readmission:  12           Discussed patient goal for the day, patient clinical progression, and barriers to discharge.  The following Goal(s) of the Day/Commitment(s) have been identified:   discharge planning, wean O2, pulm plan, IV steroids      Haile Aguilar RN  November 28, 2024

## 2024-11-28 NOTE — PROGRESS NOTES
[facility]                                Progress note    Subjective:    Patient sitting straight up in bed and feels short of breath on 4 L. Wants to ambulate the hallways.     Objective:    /64   Pulse 78   Temp 98 °F (36.7 °C) (Oral)   Resp 18   Ht 1.6 m (5' 3\")   Wt 76.6 kg (168 lb 14 oz)   SpO2 94%   BMI 29.91 kg/m²     In: 240 [P.O.:240]  Out: -   In: 240   Out: -     General appearance: NAD, conversant, appears to be working hard to rate on my evaluation today  Eyes: Sclerae anicteric, PERRLA  HEENT: AT/NC, MMM  Neck: FROM, supple, no thyromegaly  Lymph: No cervical / supraclavicular lymphadenopathy  Lungs: Coarse breath sounds bilateral lung field, significant wheeze, 4L  CV: RRR, no MRGs, no lower extremity edema  Abdomen: Soft, non-tender; no masses or HSM, +BS  Extremities: FROM without synovitis.  No clubbing or cyanosis of the hands.  Skin: no rash, induration, lesions, or ulcers  Psych: Calm and cooperative.  Normal judgement and insight.  Normal mood and affect.  Neuro: Alert and interactive, face symmetric, speech fluent.     Recent Labs     11/26/24  0412 11/27/24  0508 11/28/24  0309   WBC 10.7 9.0 8.9   HGB 12.1 11.7 11.4*   HCT 38.7 37.4 34.8    252 222       Recent Labs     11/26/24  0412 11/27/24  0508 11/28/24  0309    137 136   K 4.2 4.0 4.1    100 101   CO2 23 26 26   BUN 20 16 15   CREATININE 0.8 0.7 0.7   CALCIUM 9.1 8.8 8.7       Assessment:    Principal Problem:    COPD exacerbation (HCC)  Resolved Problems:    * No resolved hospital problems. *      Plan:      Patient is a 64-year-old female admitted to [facility] for  COPD exacerbation.  -Monitor labs  -proBNP 83  -Respiratory panel negative  -D-dimer 586  -Increase IV Solu-Medrol 40 mg every 6 hours-  -Consult pulmonology, she needs to be established with a lung doctor as she has longstanding COPD and coarse breath sounds, impending respiratory failure acutely  -Scheduled

## 2024-11-29 LAB
ANION GAP SERPL CALCULATED.3IONS-SCNC: 10 MMOL/L (ref 7–16)
BASOPHILS # BLD: 0.01 K/UL (ref 0–0.2)
BASOPHILS NFR BLD: 0 % (ref 0–2)
BUN SERPL-MCNC: 17 MG/DL (ref 6–23)
CALCIUM SERPL-MCNC: 8.5 MG/DL (ref 8.6–10.2)
CHLORIDE SERPL-SCNC: 100 MMOL/L (ref 98–107)
CO2 SERPL-SCNC: 27 MMOL/L (ref 22–29)
CREAT SERPL-MCNC: 0.7 MG/DL (ref 0.5–1)
EOSINOPHIL # BLD: 0 K/UL (ref 0.05–0.5)
EOSINOPHILS RELATIVE PERCENT: 0 % (ref 0–6)
ERYTHROCYTE [DISTWIDTH] IN BLOOD BY AUTOMATED COUNT: 13.1 % (ref 11.5–15)
GFR, ESTIMATED: >90 ML/MIN/1.73M2
GLUCOSE SERPL-MCNC: 155 MG/DL (ref 74–99)
HCT VFR BLD AUTO: 35.5 % (ref 34–48)
HGB BLD-MCNC: 11.8 G/DL (ref 11.5–15.5)
IMM GRANULOCYTES # BLD AUTO: 0.1 K/UL (ref 0–0.58)
IMM GRANULOCYTES NFR BLD: 1 % (ref 0–5)
LYMPHOCYTES NFR BLD: 0.51 K/UL (ref 1.5–4)
LYMPHOCYTES RELATIVE PERCENT: 7 % (ref 20–42)
MCH RBC QN AUTO: 31.9 PG (ref 26–35)
MCHC RBC AUTO-ENTMCNC: 33.2 G/DL (ref 32–34.5)
MCV RBC AUTO: 95.9 FL (ref 80–99.9)
MONOCYTES NFR BLD: 0.43 K/UL (ref 0.1–0.95)
MONOCYTES NFR BLD: 6 % (ref 2–12)
NEUTROPHILS NFR BLD: 85 % (ref 43–80)
NEUTS SEG NFR BLD: 5.99 K/UL (ref 1.8–7.3)
PLATELET # BLD AUTO: 249 K/UL (ref 130–450)
PMV BLD AUTO: 8.4 FL (ref 7–12)
POTASSIUM SERPL-SCNC: 3.9 MMOL/L (ref 3.5–5)
RBC # BLD AUTO: 3.7 M/UL (ref 3.5–5.5)
RBC # BLD: ABNORMAL 10*6/UL
SODIUM SERPL-SCNC: 137 MMOL/L (ref 132–146)
WBC OTHER # BLD: 7 K/UL (ref 4.5–11.5)

## 2024-11-29 PROCEDURE — 6360000002 HC RX W HCPCS: Performed by: INTERNAL MEDICINE

## 2024-11-29 PROCEDURE — 94669 MECHANICAL CHEST WALL OSCILL: CPT

## 2024-11-29 PROCEDURE — 6370000000 HC RX 637 (ALT 250 FOR IP): Performed by: NURSE PRACTITIONER

## 2024-11-29 PROCEDURE — 2700000000 HC OXYGEN THERAPY PER DAY

## 2024-11-29 PROCEDURE — 6360000002 HC RX W HCPCS

## 2024-11-29 PROCEDURE — 94640 AIRWAY INHALATION TREATMENT: CPT

## 2024-11-29 PROCEDURE — 2580000003 HC RX 258

## 2024-11-29 PROCEDURE — 80048 BASIC METABOLIC PNL TOTAL CA: CPT

## 2024-11-29 PROCEDURE — 2580000003 HC RX 258: Performed by: FAMILY MEDICINE

## 2024-11-29 PROCEDURE — 6370000000 HC RX 637 (ALT 250 FOR IP): Performed by: INTERNAL MEDICINE

## 2024-11-29 PROCEDURE — 6370000000 HC RX 637 (ALT 250 FOR IP): Performed by: FAMILY MEDICINE

## 2024-11-29 PROCEDURE — 2060000000 HC ICU INTERMEDIATE R&B

## 2024-11-29 PROCEDURE — 6360000002 HC RX W HCPCS: Performed by: FAMILY MEDICINE

## 2024-11-29 PROCEDURE — 85025 COMPLETE CBC W/AUTO DIFF WBC: CPT

## 2024-11-29 RX ORDER — WATER 10 ML/10ML
INJECTION INTRAMUSCULAR; INTRAVENOUS; SUBCUTANEOUS
Status: COMPLETED
Start: 2024-11-29 | End: 2024-11-29

## 2024-11-29 RX ADMIN — BUDESONIDE 1000 MCG: 0.5 SUSPENSION RESPIRATORY (INHALATION) at 19:48

## 2024-11-29 RX ADMIN — HYDROCODONE BITARTRATE AND HOMATROPINE METHYLBROMIDE 5 ML: 5; 1.5 SOLUTION ORAL at 11:44

## 2024-11-29 RX ADMIN — CLONAZEPAM 0.5 MG: 0.5 TABLET ORAL at 02:11

## 2024-11-29 RX ADMIN — ENOXAPARIN SODIUM 40 MG: 100 INJECTION SUBCUTANEOUS at 07:48

## 2024-11-29 RX ADMIN — CLONAZEPAM 0.5 MG: 0.5 TABLET ORAL at 20:56

## 2024-11-29 RX ADMIN — IPRATROPIUM BROMIDE AND ALBUTEROL SULFATE 1 DOSE: 2.5; .5 SOLUTION RESPIRATORY (INHALATION) at 00:54

## 2024-11-29 RX ADMIN — METHYLPREDNISOLONE SODIUM SUCCINATE 60 MG: 125 INJECTION INTRAMUSCULAR; INTRAVENOUS at 05:45

## 2024-11-29 RX ADMIN — GABAPENTIN 400 MG: 400 CAPSULE ORAL at 07:48

## 2024-11-29 RX ADMIN — HYDROCODONE BITARTRATE AND HOMATROPINE METHYLBROMIDE 5 ML: 5; 1.5 SOLUTION ORAL at 17:45

## 2024-11-29 RX ADMIN — SODIUM CHLORIDE, PRESERVATIVE FREE 10 ML: 5 INJECTION INTRAVENOUS at 08:48

## 2024-11-29 RX ADMIN — GABAPENTIN 400 MG: 400 CAPSULE ORAL at 13:48

## 2024-11-29 RX ADMIN — DULOXETINE HYDROCHLORIDE 60 MG: 60 CAPSULE, DELAYED RELEASE ORAL at 07:48

## 2024-11-29 RX ADMIN — WATER: 1 INJECTION INTRAMUSCULAR; INTRAVENOUS; SUBCUTANEOUS at 10:44

## 2024-11-29 RX ADMIN — IPRATROPIUM BROMIDE AND ALBUTEROL SULFATE 1 DOSE: 2.5; .5 SOLUTION RESPIRATORY (INHALATION) at 15:55

## 2024-11-29 RX ADMIN — ARFORMOTEROL TARTRATE 15 MCG: 15 SOLUTION RESPIRATORY (INHALATION) at 19:48

## 2024-11-29 RX ADMIN — IPRATROPIUM BROMIDE AND ALBUTEROL SULFATE 1 DOSE: 2.5; .5 SOLUTION RESPIRATORY (INHALATION) at 12:22

## 2024-11-29 RX ADMIN — GABAPENTIN 400 MG: 400 CAPSULE ORAL at 20:54

## 2024-11-29 RX ADMIN — IPRATROPIUM BROMIDE AND ALBUTEROL SULFATE 1 DOSE: 2.5; .5 SOLUTION RESPIRATORY (INHALATION) at 19:49

## 2024-11-29 RX ADMIN — SODIUM CHLORIDE, PRESERVATIVE FREE 10 ML: 5 INJECTION INTRAVENOUS at 20:57

## 2024-11-29 RX ADMIN — METHYLPREDNISOLONE SODIUM SUCCINATE 60 MG: 125 INJECTION INTRAMUSCULAR; INTRAVENOUS at 10:12

## 2024-11-29 RX ADMIN — HYDROCODONE BITARTRATE AND ACETAMINOPHEN 1 TABLET: 5; 325 TABLET ORAL at 10:13

## 2024-11-29 RX ADMIN — BUDESONIDE 1000 MCG: 0.5 SUSPENSION RESPIRATORY (INHALATION) at 07:59

## 2024-11-29 RX ADMIN — ARFORMOTEROL TARTRATE 15 MCG: 15 SOLUTION RESPIRATORY (INHALATION) at 07:59

## 2024-11-29 RX ADMIN — HYDROCODONE BITARTRATE AND HOMATROPINE METHYLBROMIDE 5 ML: 5; 1.5 SOLUTION ORAL at 02:09

## 2024-11-29 RX ADMIN — METHYLPREDNISOLONE SODIUM SUCCINATE 60 MG: 125 INJECTION INTRAMUSCULAR; INTRAVENOUS at 16:20

## 2024-11-29 RX ADMIN — SODIUM CHLORIDE, PRESERVATIVE FREE 10 ML: 5 INJECTION INTRAVENOUS at 07:49

## 2024-11-29 RX ADMIN — GABAPENTIN 400 MG: 400 CAPSULE ORAL at 16:19

## 2024-11-29 RX ADMIN — IPRATROPIUM BROMIDE AND ALBUTEROL SULFATE 1 DOSE: 2.5; .5 SOLUTION RESPIRATORY (INHALATION) at 07:59

## 2024-11-29 RX ADMIN — ROSUVASTATIN CALCIUM 10 MG: 10 TABLET, FILM COATED ORAL at 20:54

## 2024-11-29 RX ADMIN — IPRATROPIUM BROMIDE AND ALBUTEROL SULFATE 1 DOSE: 2.5; .5 SOLUTION RESPIRATORY (INHALATION) at 04:32

## 2024-11-29 ASSESSMENT — PAIN SCALES - GENERAL
PAINLEVEL_OUTOF10: 0
PAINLEVEL_OUTOF10: 0
PAINLEVEL_OUTOF10: 1
PAINLEVEL_OUTOF10: 7

## 2024-11-29 ASSESSMENT — PAIN SCALES - WONG BAKER: WONGBAKER_NUMERICALRESPONSE: NO HURT

## 2024-11-29 ASSESSMENT — PAIN DESCRIPTION - LOCATION: LOCATION: OTHER (COMMENT)

## 2024-11-29 ASSESSMENT — PAIN DESCRIPTION - ORIENTATION: ORIENTATION: OTHER (COMMENT)

## 2024-11-29 NOTE — PROGRESS NOTES
[facility]                                Progress note    Subjective:    The patient is less short of breath states that chest vest treatments are helping, remaining on 4 L.     Objective:    /74   Pulse 92   Temp 97.9 °F (36.6 °C) (Axillary)   Resp 18   Ht 1.6 m (5' 3\")   Wt 76.6 kg (168 lb 14 oz)   SpO2 96%   BMI 29.91 kg/m²     In: 480 [P.O.:480]  Out: -   In: 480   Out: -     General appearance: NAD, conversant, appears to be working hard to rate on my evaluation today  Eyes: Sclerae anicteric, PERRLA  HEENT: AT/NC, MMM  Neck: FROM, supple, no thyromegaly  Lymph: No cervical / supraclavicular lymphadenopathy  Lungs: Coarse breath sounds bilateral lung field, significant wheeze, 4L  CV: RRR, no MRGs, no lower extremity edema  Abdomen: Soft, non-tender; no masses or HSM, +BS  Extremities: FROM without synovitis.  No clubbing or cyanosis of the hands.  Skin: no rash, induration, lesions, or ulcers  Psych: Calm and cooperative.  Normal judgement and insight.  Normal mood and affect.  Neuro: Alert and interactive, face symmetric, speech fluent.     Recent Labs     11/27/24  0508 11/28/24  0309 11/29/24  0321   WBC 9.0 8.9 7.0   HGB 11.7 11.4* 11.8   HCT 37.4 34.8 35.5    222 249       Recent Labs     11/27/24  0508 11/28/24  0309 11/29/24  0321    136 137   K 4.0 4.1 3.9    101 100   CO2 26 26 27   BUN 16 15 17   CREATININE 0.7 0.7 0.7   CALCIUM 8.8 8.7 8.5*       Assessment:    Principal Problem:    COPD exacerbation (HCC)  Resolved Problems:    * No resolved hospital problems. *      Plan:    Patient is a 64-year-old female admitted to [facility] for  COPD exacerbation.  -Monitor labs  -proBNP 83  -Respiratory panel negative  -D-dimer 586  -Increase IV Solu-Medrol 40 mg every 6 hours-  -Consult pulmonology, she needs to be established with a lung doctor as she has longstanding COPD and coarse breath sounds, impending respiratory failure acutely  -Scheduled

## 2024-11-29 NOTE — PROGRESS NOTES
Pulmonary Progress Note    Admit Date: 2024  Hospital day                               PCP: Camryn Arriaga PA-C    Chief Complaint (s):  Patient Active Problem List   Diagnosis    Fibromyalgia    Anxiety    Chronic prescription opiate use    Smoker    Primary insomnia    Chronic pain syndrome [G89.4 (ICD-10-CM)]    Cervicalgia    Chronic bilateral low back pain without sciatica    COPD exacerbation (HCC)       Subjective:  Patient seen on 4 L nasal cannula. She has ongoing cough with little relief with chest vest.       Vitals:  VITALS:  /74   Pulse 92   Temp 97.9 °F (36.6 °C) (Axillary)   Resp 18   Ht 1.6 m (5' 3\")   Wt 76.6 kg (168 lb 14 oz)   SpO2 96%   BMI 29.91 kg/m²     24HR INTAKE/OUTPUT:      Intake/Output Summary (Last 24 hours) at 2024 1105  Last data filed at 2024 1725  Gross per 24 hour   Intake 240 ml   Output --   Net 240 ml       24HR PULSE OXIMETRY RANGE:    SpO2  Av.8 %  Min: 93 %  Max: 96 %      Medications:  IV:   sodium chloride         Scheduled Meds:   budesonide  1 mg Nebulization BID RT    ipratropium 0.5 mg-albuterol 2.5 mg  1 Dose Inhalation Q4H RT    methylPREDNISolone  60 mg IntraVENous Q6H    arformoterol tartrate  15 mcg Nebulization BID RT    sodium chloride flush  5-40 mL IntraVENous 2 times per day    enoxaparin  40 mg SubCUTAneous Daily    DULoxetine  60 mg Oral Daily    gabapentin  400 mg Oral 4x Daily    rosuvastatin  10 mg Oral Nightly       Diet:   ADULT DIET; Regular; Low Fat/Low Chol/High Fiber/ANA         EXAM:  General: No distress. Alert.  Eyes: PERRL. No sclera icterus. No conjunctival injection.  ENT: No discharge. Pharynx clear.  Neck: Trachea midline. Normal thyroid.  Resp: No accessory muscle use. no rales. no wheezing. diffuse rhonchi.   CV: Regular rate. Regular rhythm. No murmur or rub.   Abd: Non-tender. Non-distended. No masses. No organomegaly. Normal bowel sounds.   Skin: Warm and dry. No nodule on exposed

## 2024-11-29 NOTE — CARE COORDINATION
Transition of care update: Pt will return home upon discharge. Pt remains on 4L NC which is new, w/Dorina w/Kevin following: [phone] for potential O2 and nebulizer needs. Ambulatory pulse ox testing needed w/orders if pt qualifies. IV steroids continued w/pulm following.   SUYAPA SotoN, RN  [facility] Case Management  [phone]

## 2024-11-29 NOTE — PLAN OF CARE
Problem: Safety - Adult  Goal: Free from fall injury  11/29/2024 0907 by Dolores Hart RN  Outcome: Progressing  Flowsheets (Taken 11/29/2024 0745)  Free From Fall Injury: Instruct family/caregiver on patient safety  11/29/2024 0013 by Berta Gordon RN  Outcome: Progressing     Problem: Pain  Goal: Verbalizes/displays adequate comfort level or baseline comfort level  11/29/2024 0907 by Dolores Hart RN  Outcome: Progressing  Flowsheets (Taken 11/29/2024 0745)  Verbalizes/displays adequate comfort level or baseline comfort level:   Encourage patient to monitor pain and request assistance   Administer analgesics based on type and severity of pain and evaluate response   Implement non-pharmacological measures as appropriate and evaluate response  11/29/2024 0013 by Berta Gordon RN  Outcome: Progressing     Problem: Safety - Adult  Goal: Free from fall injury  11/29/2024 0908 by Dolores Hart RN  Outcome: Progressing  11/29/2024 0907 by Dolores Hart RN  Outcome: Progressing  Flowsheets (Taken 11/29/2024 0745)  Free From Fall Injury: Instruct family/caregiver on patient safety  11/29/2024 0013 by Berta Gordon RN  Outcome: Progressing     Problem: Pain  Goal: Verbalizes/displays adequate comfort level or baseline comfort level  11/29/2024 0908 by Dolores Hart RN  Outcome: Progressing  11/29/2024 0908 by Dolores Hart RN  Outcome: Progressing  11/29/2024 0907 by Dolores Hart RN  Outcome: Progressing  Flowsheets (Taken 11/29/2024 0745)  Verbalizes/displays adequate comfort level or baseline comfort level:   Encourage patient to monitor pain and request assistance   Administer analgesics based on type and severity of pain and evaluate response   Implement non-pharmacological measures as appropriate and evaluate response  11/29/2024 0013 by Berta Gordon RN  Outcome: Progressing     Problem: Discharge Planning  Goal: Discharge to home or other facility with appropriate

## 2024-11-30 PROCEDURE — 6360000002 HC RX W HCPCS

## 2024-11-30 PROCEDURE — 6370000000 HC RX 637 (ALT 250 FOR IP): Performed by: FAMILY MEDICINE

## 2024-11-30 PROCEDURE — 2580000003 HC RX 258: Performed by: FAMILY MEDICINE

## 2024-11-30 PROCEDURE — 6360000002 HC RX W HCPCS: Performed by: INTERNAL MEDICINE

## 2024-11-30 PROCEDURE — 6370000000 HC RX 637 (ALT 250 FOR IP): Performed by: INTERNAL MEDICINE

## 2024-11-30 PROCEDURE — 94640 AIRWAY INHALATION TREATMENT: CPT

## 2024-11-30 PROCEDURE — 94669 MECHANICAL CHEST WALL OSCILL: CPT

## 2024-11-30 PROCEDURE — 2060000000 HC ICU INTERMEDIATE R&B

## 2024-11-30 PROCEDURE — 6370000000 HC RX 637 (ALT 250 FOR IP): Performed by: NURSE PRACTITIONER

## 2024-11-30 PROCEDURE — 2700000000 HC OXYGEN THERAPY PER DAY

## 2024-11-30 PROCEDURE — 6360000002 HC RX W HCPCS: Performed by: FAMILY MEDICINE

## 2024-11-30 RX ADMIN — HYDROCODONE BITARTRATE AND HOMATROPINE METHYLBROMIDE 5 ML: 5; 1.5 SOLUTION ORAL at 17:28

## 2024-11-30 RX ADMIN — IPRATROPIUM BROMIDE AND ALBUTEROL SULFATE 1 DOSE: 2.5; .5 SOLUTION RESPIRATORY (INHALATION) at 04:18

## 2024-11-30 RX ADMIN — ENOXAPARIN SODIUM 40 MG: 100 INJECTION SUBCUTANEOUS at 07:42

## 2024-11-30 RX ADMIN — METHYLPREDNISOLONE SODIUM SUCCINATE 60 MG: 125 INJECTION INTRAMUSCULAR; INTRAVENOUS at 00:13

## 2024-11-30 RX ADMIN — HYDROCODONE BITARTRATE AND HOMATROPINE METHYLBROMIDE 5 ML: 5; 1.5 SOLUTION ORAL at 08:31

## 2024-11-30 RX ADMIN — METHYLPREDNISOLONE SODIUM SUCCINATE 60 MG: 125 INJECTION INTRAMUSCULAR; INTRAVENOUS at 16:26

## 2024-11-30 RX ADMIN — BUDESONIDE 1000 MCG: 0.5 SUSPENSION RESPIRATORY (INHALATION) at 20:44

## 2024-11-30 RX ADMIN — BUDESONIDE 1000 MCG: 0.5 SUSPENSION RESPIRATORY (INHALATION) at 08:46

## 2024-11-30 RX ADMIN — IPRATROPIUM BROMIDE AND ALBUTEROL SULFATE 1 DOSE: 2.5; .5 SOLUTION RESPIRATORY (INHALATION) at 20:44

## 2024-11-30 RX ADMIN — DULOXETINE HYDROCHLORIDE 60 MG: 60 CAPSULE, DELAYED RELEASE ORAL at 07:42

## 2024-11-30 RX ADMIN — SODIUM CHLORIDE, PRESERVATIVE FREE 10 ML: 5 INJECTION INTRAVENOUS at 16:26

## 2024-11-30 RX ADMIN — HYDROCODONE BITARTRATE AND ACETAMINOPHEN 1 TABLET: 5; 325 TABLET ORAL at 10:56

## 2024-11-30 RX ADMIN — IPRATROPIUM BROMIDE AND ALBUTEROL SULFATE 1 DOSE: 2.5; .5 SOLUTION RESPIRATORY (INHALATION) at 12:40

## 2024-11-30 RX ADMIN — ROSUVASTATIN CALCIUM 10 MG: 10 TABLET, FILM COATED ORAL at 21:21

## 2024-11-30 RX ADMIN — CLONAZEPAM 0.5 MG: 0.5 TABLET ORAL at 21:21

## 2024-11-30 RX ADMIN — METHYLPREDNISOLONE SODIUM SUCCINATE 60 MG: 125 INJECTION INTRAMUSCULAR; INTRAVENOUS at 06:29

## 2024-11-30 RX ADMIN — GABAPENTIN 400 MG: 400 CAPSULE ORAL at 16:26

## 2024-11-30 RX ADMIN — SODIUM CHLORIDE, PRESERVATIVE FREE 10 ML: 5 INJECTION INTRAVENOUS at 07:42

## 2024-11-30 RX ADMIN — SODIUM CHLORIDE, PRESERVATIVE FREE 10 ML: 5 INJECTION INTRAVENOUS at 21:21

## 2024-11-30 RX ADMIN — IPRATROPIUM BROMIDE AND ALBUTEROL SULFATE 1 DOSE: 2.5; .5 SOLUTION RESPIRATORY (INHALATION) at 08:46

## 2024-11-30 RX ADMIN — METHYLPREDNISOLONE SODIUM SUCCINATE 60 MG: 125 INJECTION INTRAMUSCULAR; INTRAVENOUS at 10:50

## 2024-11-30 RX ADMIN — ARFORMOTEROL TARTRATE 15 MCG: 15 SOLUTION RESPIRATORY (INHALATION) at 20:44

## 2024-11-30 RX ADMIN — IPRATROPIUM BROMIDE AND ALBUTEROL SULFATE 1 DOSE: 2.5; .5 SOLUTION RESPIRATORY (INHALATION) at 00:17

## 2024-11-30 RX ADMIN — METHYLPREDNISOLONE SODIUM SUCCINATE 60 MG: 125 INJECTION INTRAMUSCULAR; INTRAVENOUS at 21:21

## 2024-11-30 RX ADMIN — IPRATROPIUM BROMIDE AND ALBUTEROL SULFATE 1 DOSE: 2.5; .5 SOLUTION RESPIRATORY (INHALATION) at 16:24

## 2024-11-30 RX ADMIN — SODIUM CHLORIDE, PRESERVATIVE FREE 10 ML: 5 INJECTION INTRAVENOUS at 10:50

## 2024-11-30 RX ADMIN — GABAPENTIN 400 MG: 400 CAPSULE ORAL at 13:26

## 2024-11-30 RX ADMIN — GABAPENTIN 400 MG: 400 CAPSULE ORAL at 21:21

## 2024-11-30 RX ADMIN — HYDROCODONE BITARTRATE AND HOMATROPINE METHYLBROMIDE 5 ML: 5; 1.5 SOLUTION ORAL at 00:17

## 2024-11-30 RX ADMIN — ARFORMOTEROL TARTRATE 15 MCG: 15 SOLUTION RESPIRATORY (INHALATION) at 08:46

## 2024-11-30 RX ADMIN — GABAPENTIN 400 MG: 400 CAPSULE ORAL at 07:42

## 2024-11-30 ASSESSMENT — PAIN SCALES - GENERAL
PAINLEVEL_OUTOF10: 0
PAINLEVEL_OUTOF10: 6

## 2024-11-30 ASSESSMENT — PAIN DESCRIPTION - ORIENTATION: ORIENTATION: RIGHT;LEFT;LOWER

## 2024-11-30 ASSESSMENT — PAIN DESCRIPTION - DESCRIPTORS: DESCRIPTORS: ACHING

## 2024-11-30 ASSESSMENT — PAIN - FUNCTIONAL ASSESSMENT: PAIN_FUNCTIONAL_ASSESSMENT: PREVENTS OR INTERFERES SOME ACTIVE ACTIVITIES AND ADLS

## 2024-11-30 NOTE — PROGRESS NOTES
Pulse oximetry assessment   95% at rest on room air (if 88% or less, skip next steps)  88% while ambulating on room air  97% at rest on 2LPM  95% while ambulating on 2LPM

## 2024-11-30 NOTE — PLAN OF CARE
Problem: Safety - Adult  Goal: Free from fall injury  11/30/2024 0905 by Omega Adams RN  Outcome: Progressing  11/29/2024 2017 by Veronica Ma RN  Outcome: Progressing     Problem: Pain  Goal: Verbalizes/displays adequate comfort level or baseline comfort level  11/30/2024 0905 by Omega Adams RN  Outcome: Progressing  11/29/2024 2017 by Veronica Ma RN  Outcome: Progressing     Problem: Discharge Planning  Goal: Discharge to home or other facility with appropriate resources  11/30/2024 0905 by Omega Adams RN  Outcome: Progressing  Flowsheets (Taken 11/30/2024 0740)  Discharge to home or other facility with appropriate resources: Identify barriers to discharge with patient and caregiver  11/29/2024 2017 by Veronica Ma RN  Outcome: Progressing     Problem: Chronic Conditions and Co-morbidities  Goal: Patient's chronic conditions and co-morbidity symptoms are monitored and maintained or improved  11/30/2024 0905 by Omega Adams RN  Outcome: Progressing  Flowsheets (Taken 11/30/2024 0740)  Care Plan - Patient's Chronic Conditions and Co-Morbidity Symptoms are Monitored and Maintained or Improved: Monitor and assess patient's chronic conditions and comorbid symptoms for stability, deterioration, or improvement  11/29/2024 2017 by Veronica Ma RN  Outcome: Progressing

## 2024-11-30 NOTE — PROGRESS NOTES
[facility]                                Progress note    Subjective:    The patient is less short of breath states that chest vest treatments are helping, remaining on 4 L.     Objective:    /71   Pulse 71   Temp 98 °F (36.7 °C) (Oral)   Resp 16   Ht 1.6 m (5' 3\")   Wt 76.6 kg (168 lb 14 oz)   SpO2 97%   BMI 29.91 kg/m²     In: 180 [P.O.:180]  Out: -   In: 180   Out: -     General appearance: NAD, conversant, appears to be working hard to rate on my evaluation today  Eyes: Sclerae anicteric, PERRLA  HEENT: AT/NC, MMM  Neck: FROM, supple, no thyromegaly  Lymph: No cervical / supraclavicular lymphadenopathy  Lungs: Coarse breath sounds bilateral lung field, significant wheeze, 4L  CV: RRR, no MRGs, no lower extremity edema  Abdomen: Soft, non-tender; no masses or HSM, +BS  Extremities: FROM without synovitis.  No clubbing or cyanosis of the hands.  Skin: no rash, induration, lesions, or ulcers  Psych: Calm and cooperative.  Normal judgement and insight.  Normal mood and affect.  Neuro: Alert and interactive, face symmetric, speech fluent.     Recent Labs     11/28/24  0309 11/29/24  0321   WBC 8.9 7.0   HGB 11.4* 11.8   HCT 34.8 35.5    249       Recent Labs     11/28/24  0309 11/29/24  0321    137   K 4.1 3.9    100   CO2 26 27   BUN 15 17   CREATININE 0.7 0.7   CALCIUM 8.7 8.5*       Assessment:    Principal Problem:    COPD exacerbation (HCC)  Resolved Problems:    * No resolved hospital problems. *      Plan:    Patient is a 64-year-old female admitted to [facility] for  COPD exacerbation.  -Monitor labs  -proBNP 83  -Respiratory panel negative  -D-dimer 586  -Increase IV Solu-Medrol 40 mg every 6 hours-  -Consult pulmonology, she needs to be established with a lung doctor as she has longstanding COPD and coarse breath sounds, impending respiratory failure acutely  -Scheduled breathing treatments  -She stopped smoking or slow down she tells me about 2 weeks

## 2024-11-30 NOTE — PLAN OF CARE
Problem: Safety - Adult  Goal: Free from fall injury  11/29/2024 2017 by Veronica Ma RN  Outcome: Progressing  11/29/2024 0908 by Dolores Hart RN  Outcome: Progressing  11/29/2024 0907 by Dolores Hart RN  Outcome: Progressing  Flowsheets (Taken 11/29/2024 0745)  Free From Fall Injury: Instruct family/caregiver on patient safety     Problem: Pain  Goal: Verbalizes/displays adequate comfort level or baseline comfort level  11/29/2024 2017 by Veronica Ma RN  Outcome: Progressing  11/29/2024 0908 by Dolores Hart RN  Outcome: Progressing  11/29/2024 0908 by Dolores Hart RN  Outcome: Progressing  11/29/2024 0907 by Dolores Hart RN  Outcome: Progressing  Flowsheets (Taken 11/29/2024 0745)  Verbalizes/displays adequate comfort level or baseline comfort level:   Encourage patient to monitor pain and request assistance   Administer analgesics based on type and severity of pain and evaluate response   Implement non-pharmacological measures as appropriate and evaluate response     Problem: Discharge Planning  Goal: Discharge to home or other facility with appropriate resources  11/29/2024 2017 by Veronica Ma RN  Outcome: Progressing  11/29/2024 0908 by Dolores Hart RN  Outcome: Progressing  11/29/2024 0908 by Dolores Hart RN  Outcome: Progressing     Problem: Chronic Conditions and Co-morbidities  Goal: Patient's chronic conditions and co-morbidity symptoms are monitored and maintained or improved  11/29/2024 2017 by Veronica Ma RN  Outcome: Progressing  11/29/2024 0908 by Dolores Hart RN  Outcome: Progressing

## 2024-12-01 PROCEDURE — 2060000000 HC ICU INTERMEDIATE R&B

## 2024-12-01 PROCEDURE — 94640 AIRWAY INHALATION TREATMENT: CPT

## 2024-12-01 PROCEDURE — 2700000000 HC OXYGEN THERAPY PER DAY

## 2024-12-01 PROCEDURE — 6370000000 HC RX 637 (ALT 250 FOR IP): Performed by: INTERNAL MEDICINE

## 2024-12-01 PROCEDURE — 6370000000 HC RX 637 (ALT 250 FOR IP): Performed by: NURSE PRACTITIONER

## 2024-12-01 PROCEDURE — 94669 MECHANICAL CHEST WALL OSCILL: CPT

## 2024-12-01 PROCEDURE — 6360000002 HC RX W HCPCS

## 2024-12-01 PROCEDURE — 6360000002 HC RX W HCPCS: Performed by: INTERNAL MEDICINE

## 2024-12-01 PROCEDURE — 6360000002 HC RX W HCPCS: Performed by: FAMILY MEDICINE

## 2024-12-01 PROCEDURE — 6370000000 HC RX 637 (ALT 250 FOR IP): Performed by: FAMILY MEDICINE

## 2024-12-01 PROCEDURE — 2580000003 HC RX 258: Performed by: FAMILY MEDICINE

## 2024-12-01 RX ORDER — METHYLPREDNISOLONE SODIUM SUCCINATE 125 MG/2ML
60 INJECTION INTRAMUSCULAR; INTRAVENOUS EVERY 12 HOURS
Status: DISCONTINUED | OUTPATIENT
Start: 2024-12-01 | End: 2024-12-04

## 2024-12-01 RX ADMIN — BUDESONIDE 1000 MCG: 0.5 SUSPENSION RESPIRATORY (INHALATION) at 20:47

## 2024-12-01 RX ADMIN — ARFORMOTEROL TARTRATE 15 MCG: 15 SOLUTION RESPIRATORY (INHALATION) at 08:58

## 2024-12-01 RX ADMIN — HYDROCODONE BITARTRATE AND ACETAMINOPHEN 1 TABLET: 5; 325 TABLET ORAL at 11:09

## 2024-12-01 RX ADMIN — SODIUM CHLORIDE, PRESERVATIVE FREE 10 ML: 5 INJECTION INTRAVENOUS at 22:40

## 2024-12-01 RX ADMIN — ENOXAPARIN SODIUM 40 MG: 100 INJECTION SUBCUTANEOUS at 09:06

## 2024-12-01 RX ADMIN — DULOXETINE HYDROCHLORIDE 60 MG: 60 CAPSULE, DELAYED RELEASE ORAL at 09:06

## 2024-12-01 RX ADMIN — GABAPENTIN 400 MG: 400 CAPSULE ORAL at 22:29

## 2024-12-01 RX ADMIN — METHYLPREDNISOLONE SODIUM SUCCINATE 60 MG: 125 INJECTION INTRAMUSCULAR; INTRAVENOUS at 04:55

## 2024-12-01 RX ADMIN — ROSUVASTATIN CALCIUM 10 MG: 10 TABLET, FILM COATED ORAL at 22:29

## 2024-12-01 RX ADMIN — IPRATROPIUM BROMIDE AND ALBUTEROL SULFATE 1 DOSE: 2.5; .5 SOLUTION RESPIRATORY (INHALATION) at 08:58

## 2024-12-01 RX ADMIN — IPRATROPIUM BROMIDE AND ALBUTEROL SULFATE 1 DOSE: 2.5; .5 SOLUTION RESPIRATORY (INHALATION) at 00:49

## 2024-12-01 RX ADMIN — HYDROCODONE BITARTRATE AND HOMATROPINE METHYLBROMIDE 5 ML: 5; 1.5 SOLUTION ORAL at 16:07

## 2024-12-01 RX ADMIN — ARFORMOTEROL TARTRATE 15 MCG: 15 SOLUTION RESPIRATORY (INHALATION) at 20:47

## 2024-12-01 RX ADMIN — IPRATROPIUM BROMIDE AND ALBUTEROL SULFATE 1 DOSE: 2.5; .5 SOLUTION RESPIRATORY (INHALATION) at 20:47

## 2024-12-01 RX ADMIN — CLONAZEPAM 0.5 MG: 0.5 TABLET ORAL at 22:30

## 2024-12-01 RX ADMIN — HYDROCODONE BITARTRATE AND ACETAMINOPHEN 1 TABLET: 5; 325 TABLET ORAL at 22:29

## 2024-12-01 RX ADMIN — IPRATROPIUM BROMIDE AND ALBUTEROL SULFATE 1 DOSE: 2.5; .5 SOLUTION RESPIRATORY (INHALATION) at 13:01

## 2024-12-01 RX ADMIN — IPRATROPIUM BROMIDE AND ALBUTEROL SULFATE 1 DOSE: 2.5; .5 SOLUTION RESPIRATORY (INHALATION) at 16:04

## 2024-12-01 RX ADMIN — HYDROCODONE BITARTRATE AND HOMATROPINE METHYLBROMIDE 5 ML: 5; 1.5 SOLUTION ORAL at 01:28

## 2024-12-01 RX ADMIN — GABAPENTIN 400 MG: 400 CAPSULE ORAL at 16:07

## 2024-12-01 RX ADMIN — HYDROCODONE BITARTRATE AND HOMATROPINE METHYLBROMIDE 5 ML: 5; 1.5 SOLUTION ORAL at 09:06

## 2024-12-01 RX ADMIN — BUDESONIDE 1000 MCG: 0.5 SUSPENSION RESPIRATORY (INHALATION) at 08:58

## 2024-12-01 RX ADMIN — METHYLPREDNISOLONE SODIUM SUCCINATE 60 MG: 125 INJECTION INTRAMUSCULAR; INTRAVENOUS at 11:11

## 2024-12-01 RX ADMIN — GABAPENTIN 400 MG: 400 CAPSULE ORAL at 12:44

## 2024-12-01 RX ADMIN — SODIUM CHLORIDE, PRESERVATIVE FREE 10 ML: 5 INJECTION INTRAVENOUS at 09:06

## 2024-12-01 RX ADMIN — IPRATROPIUM BROMIDE AND ALBUTEROL SULFATE 1 DOSE: 2.5; .5 SOLUTION RESPIRATORY (INHALATION) at 05:11

## 2024-12-01 RX ADMIN — GABAPENTIN 400 MG: 400 CAPSULE ORAL at 09:06

## 2024-12-01 RX ADMIN — METHYLPREDNISOLONE SODIUM SUCCINATE 60 MG: 125 INJECTION INTRAMUSCULAR; INTRAVENOUS at 22:30

## 2024-12-01 RX ADMIN — HYDROCODONE BITARTRATE AND HOMATROPINE METHYLBROMIDE 5 ML: 5; 1.5 SOLUTION ORAL at 22:45

## 2024-12-01 ASSESSMENT — PAIN SCALES - GENERAL
PAINLEVEL_OUTOF10: 6
PAINLEVEL_OUTOF10: 5
PAINLEVEL_OUTOF10: 6

## 2024-12-01 ASSESSMENT — PAIN DESCRIPTION - DESCRIPTORS: DESCRIPTORS: DISCOMFORT

## 2024-12-01 ASSESSMENT — PAIN DESCRIPTION - ORIENTATION: ORIENTATION: RIGHT;LEFT

## 2024-12-01 ASSESSMENT — PAIN - FUNCTIONAL ASSESSMENT: PAIN_FUNCTIONAL_ASSESSMENT: PREVENTS OR INTERFERES SOME ACTIVE ACTIVITIES AND ADLS

## 2024-12-01 ASSESSMENT — PAIN DESCRIPTION - LOCATION
LOCATION: BACK
LOCATION: OTHER (COMMENT)

## 2024-12-01 NOTE — PROGRESS NOTES
[facility]                                Progress note    Subjective:    Oxygen requirement down to about 2-1/2 L  Resting comfortably in no acute distress  No family at bedside    Objective:    BP (!) 91/56   Pulse 75   Temp 98.4 °F (36.9 °C) (Oral)   Resp 18   Ht 1.6 m (5' 3\")   Wt 76.6 kg (168 lb 14 oz)   SpO2 91%   BMI 29.91 kg/m²     In: 180 [P.O.:180]  Out: -   In: 180   Out: -     General appearance: NAD, conversant, appears to be working hard to rate on my evaluation today  Eyes: Sclerae anicteric, PERRLA  HEENT: AT/NC, MMM  Neck: FROM, supple, no thyromegaly  Lymph: No cervical / supraclavicular lymphadenopathy  Lungs: Coarse breath sounds bilateral lung field, no further wheezes, but she does have deep coarse breath sounds from longstanding COPD  CV: RRR, no MRGs, no lower extremity edema  Abdomen: Soft, non-tender; no masses or HSM, +BS  Extremities: FROM without synovitis.  No clubbing or cyanosis of the hands.  Skin: no rash, induration, lesions, or ulcers  Psych: Calm and cooperative.  Normal judgement and insight.  Normal mood and affect.  Neuro: Alert and interactive, face symmetric, speech fluent.     Recent Labs     11/29/24  0321   WBC 7.0   HGB 11.8   HCT 35.5          Recent Labs     11/29/24  0321      K 3.9      CO2 27   BUN 17   CREATININE 0.7   CALCIUM 8.5*       Assessment:    Principal Problem:    COPD exacerbation (HCC)  Resolved Problems:    * No resolved hospital problems. *      Plan:    Patient is a 64-year-old female admitted to [facility] for  COPD exacerbation.  -Monitor labs  -proBNP 83  -Respiratory panel negative  -D-dimer 586  -Increase IV Solu-Medrol 40 mg every 6 hours-  -Consult pulmonology, she needs to be established with a lung doctor as she has longstanding COPD and coarse breath sounds, impending respiratory failure acutely  -Scheduled breathing treatments  -She stopped smoking or slow down she tells me about 2 weeks

## 2024-12-01 NOTE — PROGRESS NOTES
Pulmonary Progress Note    Admit Date: 2024  Hospital day                               PCP: Camryn Arriaga PA-C    Chief Complaint (s):  Patient Active Problem List   Diagnosis    Fibromyalgia    Anxiety    Chronic prescription opiate use    Smoker    Primary insomnia    Chronic pain syndrome [G89.4 (ICD-10-CM)]    Cervicalgia    Chronic bilateral low back pain without sciatica    COPD exacerbation (HCC)       Subjective:  Resting comfortably this p.m.  Somewhat better with vest treatments but still with plenty of secretions.      Vitals:  VITALS:  /68   Pulse 68   Temp 98.1 °F (36.7 °C) (Oral)   Resp 16   Ht 1.6 m (5' 3\")   Wt 76.6 kg (168 lb 14 oz)   SpO2 96%   BMI 29.91 kg/m²     24HR INTAKE/OUTPUT:      Intake/Output Summary (Last 24 hours) at 2024  Last data filed at 2024 1036  Gross per 24 hour   Intake 180 ml   Output --   Net 180 ml       24HR PULSE OXIMETRY RANGE:    SpO2  Av.2 %  Min: 96 %  Max: 98 %      Medications:  IV:   sodium chloride         Scheduled Meds:   budesonide  1 mg Nebulization BID RT    ipratropium 0.5 mg-albuterol 2.5 mg  1 Dose Inhalation Q4H RT    methylPREDNISolone  60 mg IntraVENous Q6H    arformoterol tartrate  15 mcg Nebulization BID RT    sodium chloride flush  5-40 mL IntraVENous 2 times per day    enoxaparin  40 mg SubCUTAneous Daily    DULoxetine  60 mg Oral Daily    gabapentin  400 mg Oral 4x Daily    rosuvastatin  10 mg Oral Nightly       Diet:   ADULT DIET; Regular; Low Fat/Low Chol/High Fiber/ANA         EXAM:  General: No distress. Alert.  Eyes: PERRL. No sclera icterus. No conjunctival injection.  ENT: No discharge. Pharynx clear.  Neck: Trachea midline. Normal thyroid.  Resp: No accessory muscle use. no rales. no wheezing. diffuse rhonchi.   CV: Regular rate. Regular rhythm. No murmur or rub.   Abd: Non-tender. Non-distended. No masses. No organomegaly. Normal bowel sounds.   Skin: Warm and dry. No nodule on exposed

## 2024-12-01 NOTE — PROGRESS NOTES
Pulmonary Progress Note    Admit Date: 2024  Hospital day                               PCP: Camryn Arriaga PA-C    Chief Complaint (s):  Patient Active Problem List   Diagnosis    Fibromyalgia    Anxiety    Chronic prescription opiate use    Smoker    Primary insomnia    Chronic pain syndrome [G89.4 (ICD-10-CM)]    Cervicalgia    Chronic bilateral low back pain without sciatica    COPD exacerbation (HCC)       Subjective:  Patient seen on 2 L nasal cannula. She feels okay. Still feels like all of her secretions are not being produced from her lower airways. Cough continues especially after vest treatments.       Vitals:  VITALS:  /67   Pulse 61   Temp 98.4 °F (36.9 °C) (Oral)   Resp 20   Ht 1.6 m (5' 3\")   Wt 76.6 kg (168 lb 14 oz)   SpO2 93%   BMI 29.91 kg/m²     24HR INTAKE/OUTPUT:    No intake or output data in the 24 hours ending 24 1240      24HR PULSE OXIMETRY RANGE:    SpO2  Av.6 %  Min: 91 %  Max: 97 %      Medications:  IV:   sodium chloride         Scheduled Meds:   methylPREDNISolone  60 mg IntraVENous Q12H    budesonide  1 mg Nebulization BID RT    ipratropium 0.5 mg-albuterol 2.5 mg  1 Dose Inhalation Q4H RT    arformoterol tartrate  15 mcg Nebulization BID RT    sodium chloride flush  5-40 mL IntraVENous 2 times per day    enoxaparin  40 mg SubCUTAneous Daily    DULoxetine  60 mg Oral Daily    gabapentin  400 mg Oral 4x Daily    rosuvastatin  10 mg Oral Nightly       Diet:   ADULT DIET; Regular; Low Fat/Low Chol/High Fiber/ANA         EXAM:  General: No distress. Alert.  Eyes: PERRL. No sclera icterus. No conjunctival injection.  ENT: No discharge. Pharynx clear.  Neck: Trachea midline. Normal thyroid.  Resp: No accessory muscle use. Bilateral coarse rales. no wheezing. diffuse rhonchi.   CV: Regular rate. Regular rhythm. No murmur or rub.   Abd: Non-tender. Non-distended. No masses. No organomegaly. Normal bowel sounds.   Skin: Warm and dry. No nodule on exposed  pap  Wean supplemental oxygen with goal SpO2 88%-92%.   PFT outpatient.       Time at the bedside, reviewing labs and radiographs, reviewing updated notes and consultations, discussing with staff and family was more than 50 minutes.    Please note that voice recognition technology was used in the preparation of this note and make therefore it may contain inadvertent transcription errors.  If the patient is a COVID 19 isolation patient, the above physical exam reflects that of the examining physician for the day.    ROCIO Centeno-NP  Attestation    The patient was seen and personally examined.  History is obtained through the patient by myself.  Impression and plan are mine.    Sleeping soundly with a kaley bear at her side.  Respirations appear unlabored.    Documentation only provided per the nurse practitioner.    Additions and corrections are reflected in the signed note.    Simone Rose MD,  M.D., F.C.C.P.  [facility]    [facility], [address] [place] [address], [address], [place] [address]  Office visits:  [address] [place] [address]

## 2024-12-02 PROBLEM — T17.500A MUCUS PLUGGING OF BRONCHI: Status: ACTIVE | Noted: 2024-11-24

## 2024-12-02 LAB
ALBUMIN SERPL-MCNC: 4.1 G/DL (ref 3.5–5.2)
ALP SERPL-CCNC: 51 U/L (ref 35–104)
ALT SERPL-CCNC: 33 U/L (ref 0–32)
ANION GAP SERPL CALCULATED.3IONS-SCNC: 12 MMOL/L (ref 7–16)
AST SERPL-CCNC: 20 U/L (ref 0–31)
BILIRUB SERPL-MCNC: 0.4 MG/DL (ref 0–1.2)
BUN SERPL-MCNC: 20 MG/DL (ref 6–23)
CALCIUM SERPL-MCNC: 8.7 MG/DL (ref 8.6–10.2)
CHLORIDE SERPL-SCNC: 99 MMOL/L (ref 98–107)
CO2 SERPL-SCNC: 27 MMOL/L (ref 22–29)
CREAT SERPL-MCNC: 0.7 MG/DL (ref 0.5–1)
GFR, ESTIMATED: >90 ML/MIN/1.73M2
GLUCOSE SERPL-MCNC: 181 MG/DL (ref 74–99)
POTASSIUM SERPL-SCNC: 4.2 MMOL/L (ref 3.5–5)
PROT SERPL-MCNC: 6.7 G/DL (ref 6.4–8.3)
SODIUM SERPL-SCNC: 138 MMOL/L (ref 132–146)

## 2024-12-02 PROCEDURE — 94640 AIRWAY INHALATION TREATMENT: CPT

## 2024-12-02 PROCEDURE — 2700000000 HC OXYGEN THERAPY PER DAY

## 2024-12-02 PROCEDURE — 6370000000 HC RX 637 (ALT 250 FOR IP): Performed by: INTERNAL MEDICINE

## 2024-12-02 PROCEDURE — 6370000000 HC RX 637 (ALT 250 FOR IP): Performed by: FAMILY MEDICINE

## 2024-12-02 PROCEDURE — 6360000002 HC RX W HCPCS

## 2024-12-02 PROCEDURE — 80053 COMPREHEN METABOLIC PANEL: CPT

## 2024-12-02 PROCEDURE — 6370000000 HC RX 637 (ALT 250 FOR IP): Performed by: NURSE PRACTITIONER

## 2024-12-02 PROCEDURE — 6360000002 HC RX W HCPCS: Performed by: FAMILY MEDICINE

## 2024-12-02 PROCEDURE — 2580000003 HC RX 258: Performed by: FAMILY MEDICINE

## 2024-12-02 PROCEDURE — 2060000000 HC ICU INTERMEDIATE R&B

## 2024-12-02 PROCEDURE — 94669 MECHANICAL CHEST WALL OSCILL: CPT

## 2024-12-02 PROCEDURE — 6360000002 HC RX W HCPCS: Performed by: INTERNAL MEDICINE

## 2024-12-02 RX ADMIN — ARFORMOTEROL TARTRATE 15 MCG: 15 SOLUTION RESPIRATORY (INHALATION) at 08:20

## 2024-12-02 RX ADMIN — IPRATROPIUM BROMIDE AND ALBUTEROL SULFATE 1 DOSE: 2.5; .5 SOLUTION RESPIRATORY (INHALATION) at 00:06

## 2024-12-02 RX ADMIN — IPRATROPIUM BROMIDE AND ALBUTEROL SULFATE 1 DOSE: 2.5; .5 SOLUTION RESPIRATORY (INHALATION) at 16:17

## 2024-12-02 RX ADMIN — SODIUM CHLORIDE, PRESERVATIVE FREE 10 ML: 5 INJECTION INTRAVENOUS at 21:52

## 2024-12-02 RX ADMIN — CLONAZEPAM 0.5 MG: 0.5 TABLET ORAL at 12:00

## 2024-12-02 RX ADMIN — HYDROCODONE BITARTRATE AND HOMATROPINE METHYLBROMIDE 5 ML: 5; 1.5 SOLUTION ORAL at 21:53

## 2024-12-02 RX ADMIN — HYDROCODONE BITARTRATE AND ACETAMINOPHEN 1 TABLET: 5; 325 TABLET ORAL at 08:35

## 2024-12-02 RX ADMIN — HYDROCODONE BITARTRATE AND HOMATROPINE METHYLBROMIDE 5 ML: 5; 1.5 SOLUTION ORAL at 12:00

## 2024-12-02 RX ADMIN — BUDESONIDE 1000 MCG: 0.5 SUSPENSION RESPIRATORY (INHALATION) at 08:20

## 2024-12-02 RX ADMIN — DULOXETINE HYDROCHLORIDE 60 MG: 60 CAPSULE, DELAYED RELEASE ORAL at 08:35

## 2024-12-02 RX ADMIN — GABAPENTIN 400 MG: 400 CAPSULE ORAL at 11:57

## 2024-12-02 RX ADMIN — ROSUVASTATIN CALCIUM 10 MG: 10 TABLET, FILM COATED ORAL at 21:51

## 2024-12-02 RX ADMIN — SODIUM CHLORIDE, PRESERVATIVE FREE 10 ML: 5 INJECTION INTRAVENOUS at 08:42

## 2024-12-02 RX ADMIN — METHYLPREDNISOLONE SODIUM SUCCINATE 60 MG: 125 INJECTION INTRAMUSCULAR; INTRAVENOUS at 11:57

## 2024-12-02 RX ADMIN — ENOXAPARIN SODIUM 40 MG: 100 INJECTION SUBCUTANEOUS at 08:35

## 2024-12-02 RX ADMIN — ARFORMOTEROL TARTRATE 15 MCG: 15 SOLUTION RESPIRATORY (INHALATION) at 20:35

## 2024-12-02 RX ADMIN — IPRATROPIUM BROMIDE AND ALBUTEROL SULFATE 1 DOSE: 2.5; .5 SOLUTION RESPIRATORY (INHALATION) at 20:35

## 2024-12-02 RX ADMIN — IPRATROPIUM BROMIDE AND ALBUTEROL SULFATE 1 DOSE: 2.5; .5 SOLUTION RESPIRATORY (INHALATION) at 12:31

## 2024-12-02 RX ADMIN — GABAPENTIN 400 MG: 400 CAPSULE ORAL at 21:51

## 2024-12-02 RX ADMIN — GABAPENTIN 400 MG: 400 CAPSULE ORAL at 08:35

## 2024-12-02 RX ADMIN — GABAPENTIN 400 MG: 400 CAPSULE ORAL at 17:09

## 2024-12-02 RX ADMIN — IPRATROPIUM BROMIDE AND ALBUTEROL SULFATE 1 DOSE: 2.5; .5 SOLUTION RESPIRATORY (INHALATION) at 03:43

## 2024-12-02 RX ADMIN — BUDESONIDE 1000 MCG: 0.5 SUSPENSION RESPIRATORY (INHALATION) at 20:35

## 2024-12-02 RX ADMIN — IPRATROPIUM BROMIDE AND ALBUTEROL SULFATE 1 DOSE: 2.5; .5 SOLUTION RESPIRATORY (INHALATION) at 08:20

## 2024-12-02 ASSESSMENT — PAIN SCALES - GENERAL
PAINLEVEL_OUTOF10: 4
PAINLEVEL_OUTOF10: 6
PAINLEVEL_OUTOF10: 6

## 2024-12-02 ASSESSMENT — PAIN DESCRIPTION - DESCRIPTORS: DESCRIPTORS: CRAMPING;ACHING

## 2024-12-02 ASSESSMENT — PAIN DESCRIPTION - LOCATION: LOCATION: BACK

## 2024-12-02 NOTE — PROGRESS NOTES
[facility]                                Progress note    Subjective:    Resting comfortable in bed  States she is feeling somewhat better today    Objective:    /67   Pulse 64   Temp 98.2 °F (36.8 °C) (Oral)   Resp 18   Ht 1.6 m (5' 3\")   Wt 76.6 kg (168 lb 14 oz)   SpO2 97%   BMI 29.91 kg/m²     No intake/output data recorded.  No intake/output data recorded.    General appearance: NAD, conversant, appears to be working hard to rate on my evaluation today  Eyes: Sclerae anicteric, PERRLA  HEENT: AT/NC, MMM  Neck: FROM, supple, no thyromegaly  Lymph: No cervical / supraclavicular lymphadenopathy  Lungs: Coarse breath sounds bilateral lung field, no further wheezes, but she does have deep coarse breath sounds from longstanding COPD  CV: RRR, no MRGs, no lower extremity edema  Abdomen: Soft, non-tender; no masses or HSM, +BS  Extremities: FROM without synovitis.  No clubbing or cyanosis of the hands.  Skin: no rash, induration, lesions, or ulcers  Psych: Calm and cooperative.  Normal judgement and insight.  Normal mood and affect.  Neuro: Alert and interactive, face symmetric, speech fluent     No results for input(s): \"WBC\", \"HGB\", \"HCT\", \"PLT\" in the last 72 hours.    Recent Labs     12/02/24  0813      K 4.2   CL 99   CO2 27   BUN 20   CREATININE 0.7   CALCIUM 8.7       Assessment:    Principal Problem:    COPD exacerbation (HCC)  Resolved Problems:    * No resolved hospital problems. *      Plan:    Patient is a 64-year-old female admitted to [facility] for  COPD exacerbation.  -Monitor labs  -proBNP 83  -Respiratory panel negative  -D-dimer 586  -Increase IV Solu-Medrol 40 mg every 6 hours-  -Consult pulmonology, she needs to be established with a lung doctor as she has longstanding COPD and coarse breath sounds, impending respiratory failure acutely  -Scheduled breathing treatments  -She stopped smoking or slow down she tells me about 2 weeks ago-does not wear oxygen at

## 2024-12-02 NOTE — PROGRESS NOTES
Pulmonary Progress Note    Admit Date: 2024  Hospital day                               PCP: Camryn Arriaga PA-C    Chief Complaint (s):  Patient Active Problem List   Diagnosis    Fibromyalgia    Anxiety    Chronic prescription opiate use    Smoker    Primary insomnia    Chronic pain syndrome [G89.4 (ICD-10-CM)]    Cervicalgia    Chronic bilateral low back pain without sciatica    COPD exacerbation (HCC)       Subjective:  Awake and alert but still with copious secretions.      Vitals:  VITALS:  /67   Pulse 64   Temp 98.2 °F (36.8 °C) (Oral)   Resp 18   Ht 1.6 m (5' 3\")   Wt 76.6 kg (168 lb 14 oz)   SpO2 97%   BMI 29.91 kg/m²     24HR INTAKE/OUTPUT:    No intake or output data in the 24 hours ending 24 1421      24HR PULSE OXIMETRY RANGE:    SpO2  Av.4 %  Min: 95 %  Max: 98 %      Medications:  IV:   sodium chloride         Scheduled Meds:   methylPREDNISolone  60 mg IntraVENous Q12H    budesonide  1 mg Nebulization BID RT    ipratropium 0.5 mg-albuterol 2.5 mg  1 Dose Inhalation Q4H RT    arformoterol tartrate  15 mcg Nebulization BID RT    sodium chloride flush  5-40 mL IntraVENous 2 times per day    enoxaparin  40 mg SubCUTAneous Daily    DULoxetine  60 mg Oral Daily    gabapentin  400 mg Oral 4x Daily    rosuvastatin  10 mg Oral Nightly       Diet:   ADULT DIET; Regular; Low Fat/Low Chol/High Fiber/ANA         EXAM:  General: No distress. Alert.  Eyes: PERRL. No sclera icterus. No conjunctival injection.  ENT: No discharge. Pharynx clear.  Neck: Trachea midline. Normal thyroid.  Resp: No accessory muscle use. Bilateral coarse rales. no wheezing. diffuse rhonchi.   CV: Regular rate. Regular rhythm. No murmur or rub.   Abd: Non-tender. Non-distended. No masses. No organomegaly. Normal bowel sounds.   Skin: Warm and dry. No nodule on exposed extremities. No rash on exposed extremities.  Ext: No cyanosis, clubbing, edema  Lymph: No cervical LAD. No supraclavicular LAD.   M/S:

## 2024-12-02 NOTE — PROGRESS NOTES
[facility] Quality Flow/Interdisciplinary Rounds Progress Note        Quality Flow Rounds held on December 2, 2024    Disciplines Attending:  Bedside Nurse, , , and Nursing Unit Leadership    Tiffany Valentine was admitted on 11/24/2024  3:42 AM    Anticipated Discharge Date:  Expected Discharge Date: 11/29/24    Disposition:    Jose Score:  Jose Scale Score: 21    Readmission Risk              Risk of Unplanned Readmission:  11           Discussed patient goal for the day, patient clinical progression, and barriers to discharge.  The following Goal(s) of the Day/Commitment(s) have been identified:   ambulate sp02 testing, iv steroids, check pulm plan for possible d/c       Iesha Estrada RN  December 2, 2024

## 2024-12-02 NOTE — PLAN OF CARE
Problem: Safety - Adult  Goal: Free from fall injury  12/2/2024 1104 by Estrellita Hernandez RN  Outcome: Progressing  12/2/2024 0129 by Ankit Vazquez RN  Outcome: Progressing     Problem: Pain  Goal: Verbalizes/displays adequate comfort level or baseline comfort level  12/2/2024 1104 by Estrellita Hernandez RN  Outcome: Progressing  12/2/2024 0129 by Ankit Vazquez RN  Outcome: Progressing     Problem: Discharge Planning  Goal: Discharge to home or other facility with appropriate resources  12/2/2024 1104 by Estrellita Hernandez RN  Outcome: Progressing  12/2/2024 0129 by Ankit Vazquez RN  Outcome: Progressing  Flowsheets  Taken 12/1/2024 2200 by Ankit Vazquez RN  Discharge to home or other facility with appropriate resources: Identify barriers to discharge with patient and caregiver  Taken 12/1/2024 1545 by Dolores Hart RN  Discharge to home or other facility with appropriate resources:   Identify barriers to discharge with patient and caregiver   Identify discharge learning needs (meds, wound care, etc)   Refer to discharge planning if patient needs post-hospital services based on physician order or complex needs related to functional status, cognitive ability or social support system     Problem: Chronic Conditions and Co-morbidities  Goal: Patient's chronic conditions and co-morbidity symptoms are monitored and maintained or improved  12/2/2024 1104 by Estrellita Hernandez RN  Outcome: Progressing  12/2/2024 0129 by Ankit Vazquez RN  Outcome: Progressing  Flowsheets  Taken 12/1/2024 2200 by Ankit Vazquez RN  Care Plan - Patient's Chronic Conditions and Co-Morbidity Symptoms are Monitored and Maintained or Improved: Monitor and assess patient's chronic conditions and comorbid symptoms for stability, deterioration, or improvement  Taken 12/1/2024 1545 by Dolores Hart RN  Care Plan - Patient's Chronic Conditions and Co-Morbidity Symptoms are Monitored and Maintained or Improved:   Monitor

## 2024-12-02 NOTE — PROGRESS NOTES
Pulse oximetry assessment   99% at rest on room air (if 88% or less, skip next steps)  94% while ambulating on room air

## 2024-12-02 NOTE — PROGRESS NOTES
Nutrition Note     Reason for Visit:   Length of Stay    Nutrition Assessment:  Pt w/ acute hypoxic resp failure w/ bronchospasm likely associated to COPD. Consuming >75% of meals. Continue current diet and monitor.     Current Nutrition Therapies:    ADULT DIET; Regular; Low Fat/Low Chol/High Fiber/ANA    Anthropometrics:   Current Height: 160 cm (5' 3\")  Current Weight - Scale: 76.6 kg (168 lb 14 oz)      Monitoring and Evaluation:  No nutrition diagnosis. Patient will be monitored per nutrition standards of care.     Consult Dietitian if nutrition intervention essential to patient care is needed.     Discharge Planning:  No needs    EDMUND DURAN MPH, RD, LD  [phone]

## 2024-12-03 ENCOUNTER — ANESTHESIA EVENT (OUTPATIENT)
Dept: ENDOSCOPY | Age: 64
DRG: 190 | End: 2024-12-03
Payer: COMMERCIAL

## 2024-12-03 ENCOUNTER — ANESTHESIA (OUTPATIENT)
Dept: ENDOSCOPY | Age: 64
DRG: 190 | End: 2024-12-03
Payer: COMMERCIAL

## 2024-12-03 LAB
ALBUMIN SERPL-MCNC: 3.5 G/DL (ref 3.5–5.2)
ALP SERPL-CCNC: 42 U/L (ref 35–104)
ALT SERPL-CCNC: 27 U/L (ref 0–32)
ANION GAP SERPL CALCULATED.3IONS-SCNC: 6 MMOL/L (ref 7–16)
AST SERPL-CCNC: 17 U/L (ref 0–31)
BILIRUB SERPL-MCNC: 0.3 MG/DL (ref 0–1.2)
BUN SERPL-MCNC: 19 MG/DL (ref 6–23)
CALCIUM SERPL-MCNC: 8.2 MG/DL (ref 8.6–10.2)
CHLORIDE SERPL-SCNC: 99 MMOL/L (ref 98–107)
CO2 SERPL-SCNC: 28 MMOL/L (ref 22–29)
CREAT SERPL-MCNC: 0.8 MG/DL (ref 0.5–1)
GFR, ESTIMATED: 87 ML/MIN/1.73M2
GLUCOSE SERPL-MCNC: 137 MG/DL (ref 74–99)
POTASSIUM SERPL-SCNC: 4 MMOL/L (ref 3.5–5)
PROT SERPL-MCNC: 5.3 G/DL (ref 6.4–8.3)
SODIUM SERPL-SCNC: 133 MMOL/L (ref 132–146)

## 2024-12-03 PROCEDURE — 94640 AIRWAY INHALATION TREATMENT: CPT

## 2024-12-03 PROCEDURE — 87205 SMEAR GRAM STAIN: CPT

## 2024-12-03 PROCEDURE — 6360000002 HC RX W HCPCS

## 2024-12-03 PROCEDURE — 6370000000 HC RX 637 (ALT 250 FOR IP): Performed by: INTERNAL MEDICINE

## 2024-12-03 PROCEDURE — 3609027000 HC BRONCHOSCOPY: Performed by: INTERNAL MEDICINE

## 2024-12-03 PROCEDURE — 94669 MECHANICAL CHEST WALL OSCILL: CPT

## 2024-12-03 PROCEDURE — 3700000000 HC ANESTHESIA ATTENDED CARE: Performed by: INTERNAL MEDICINE

## 2024-12-03 PROCEDURE — 6360000002 HC RX W HCPCS: Performed by: FAMILY MEDICINE

## 2024-12-03 PROCEDURE — 7100000010 HC PHASE II RECOVERY - FIRST 15 MIN: Performed by: INTERNAL MEDICINE

## 2024-12-03 PROCEDURE — 80053 COMPREHEN METABOLIC PANEL: CPT

## 2024-12-03 PROCEDURE — 7100000011 HC PHASE II RECOVERY - ADDTL 15 MIN: Performed by: INTERNAL MEDICINE

## 2024-12-03 PROCEDURE — 2709999900 HC NON-CHARGEABLE SUPPLY: Performed by: INTERNAL MEDICINE

## 2024-12-03 PROCEDURE — 87070 CULTURE OTHR SPECIMN AEROBIC: CPT

## 2024-12-03 PROCEDURE — 2060000000 HC ICU INTERMEDIATE R&B

## 2024-12-03 PROCEDURE — 2580000003 HC RX 258: Performed by: FAMILY MEDICINE

## 2024-12-03 PROCEDURE — 87077 CULTURE AEROBIC IDENTIFY: CPT

## 2024-12-03 PROCEDURE — 6370000000 HC RX 637 (ALT 250 FOR IP): Performed by: FAMILY MEDICINE

## 2024-12-03 PROCEDURE — 6360000002 HC RX W HCPCS: Performed by: INTERNAL MEDICINE

## 2024-12-03 PROCEDURE — 6370000000 HC RX 637 (ALT 250 FOR IP): Performed by: NURSE PRACTITIONER

## 2024-12-03 PROCEDURE — 2700000000 HC OXYGEN THERAPY PER DAY

## 2024-12-03 PROCEDURE — 36415 COLL VENOUS BLD VENIPUNCTURE: CPT

## 2024-12-03 RX ORDER — FENTANYL CITRATE 50 UG/ML
25 INJECTION, SOLUTION INTRAMUSCULAR; INTRAVENOUS EVERY 5 MIN PRN
Status: DISCONTINUED | OUTPATIENT
Start: 2024-12-03 | End: 2024-12-03 | Stop reason: HOSPADM

## 2024-12-03 RX ORDER — DIPHENHYDRAMINE HYDROCHLORIDE 50 MG/ML
12.5 INJECTION INTRAMUSCULAR; INTRAVENOUS
Status: DISCONTINUED | OUTPATIENT
Start: 2024-12-03 | End: 2024-12-03 | Stop reason: HOSPADM

## 2024-12-03 RX ORDER — LABETALOL HYDROCHLORIDE 5 MG/ML
5 INJECTION, SOLUTION INTRAVENOUS
Status: DISCONTINUED | OUTPATIENT
Start: 2024-12-03 | End: 2024-12-03 | Stop reason: HOSPADM

## 2024-12-03 RX ORDER — HYDRALAZINE HYDROCHLORIDE 20 MG/ML
5 INJECTION INTRAMUSCULAR; INTRAVENOUS
Status: DISCONTINUED | OUTPATIENT
Start: 2024-12-03 | End: 2024-12-03 | Stop reason: HOSPADM

## 2024-12-03 RX ORDER — SODIUM CHLORIDE 9 MG/ML
INJECTION, SOLUTION INTRAVENOUS PRN
Status: DISCONTINUED | OUTPATIENT
Start: 2024-12-03 | End: 2024-12-03 | Stop reason: HOSPADM

## 2024-12-03 RX ORDER — SODIUM CHLORIDE 0.9 % (FLUSH) 0.9 %
5-40 SYRINGE (ML) INJECTION PRN
Status: DISCONTINUED | OUTPATIENT
Start: 2024-12-03 | End: 2024-12-03 | Stop reason: HOSPADM

## 2024-12-03 RX ORDER — SODIUM CHLORIDE 0.9 % (FLUSH) 0.9 %
5-40 SYRINGE (ML) INJECTION EVERY 12 HOURS SCHEDULED
Status: DISCONTINUED | OUTPATIENT
Start: 2024-12-03 | End: 2024-12-03 | Stop reason: HOSPADM

## 2024-12-03 RX ORDER — PROPOFOL 10 MG/ML
INJECTION, EMULSION INTRAVENOUS
Status: DISCONTINUED | OUTPATIENT
Start: 2024-12-03 | End: 2024-12-03 | Stop reason: SDUPTHER

## 2024-12-03 RX ORDER — MEPERIDINE HYDROCHLORIDE 25 MG/ML
12.5 INJECTION INTRAMUSCULAR; INTRAVENOUS; SUBCUTANEOUS
Status: DISCONTINUED | OUTPATIENT
Start: 2024-12-03 | End: 2024-12-03 | Stop reason: HOSPADM

## 2024-12-03 RX ORDER — PROCHLORPERAZINE EDISYLATE 5 MG/ML
5 INJECTION INTRAMUSCULAR; INTRAVENOUS
Status: DISCONTINUED | OUTPATIENT
Start: 2024-12-03 | End: 2024-12-03 | Stop reason: HOSPADM

## 2024-12-03 RX ORDER — NALOXONE HYDROCHLORIDE 0.4 MG/ML
INJECTION, SOLUTION INTRAMUSCULAR; INTRAVENOUS; SUBCUTANEOUS PRN
Status: DISCONTINUED | OUTPATIENT
Start: 2024-12-03 | End: 2024-12-03 | Stop reason: HOSPADM

## 2024-12-03 RX ADMIN — HYDROCODONE BITARTRATE AND ACETAMINOPHEN 1 TABLET: 5; 325 TABLET ORAL at 02:08

## 2024-12-03 RX ADMIN — HYDROCODONE BITARTRATE AND HOMATROPINE METHYLBROMIDE 5 ML: 5; 1.5 SOLUTION ORAL at 21:45

## 2024-12-03 RX ADMIN — METHYLPREDNISOLONE SODIUM SUCCINATE 60 MG: 125 INJECTION INTRAMUSCULAR; INTRAVENOUS at 00:14

## 2024-12-03 RX ADMIN — IPRATROPIUM BROMIDE AND ALBUTEROL SULFATE 1 DOSE: 2.5; .5 SOLUTION RESPIRATORY (INHALATION) at 20:00

## 2024-12-03 RX ADMIN — IPRATROPIUM BROMIDE AND ALBUTEROL SULFATE 1 DOSE: 2.5; .5 SOLUTION RESPIRATORY (INHALATION) at 08:38

## 2024-12-03 RX ADMIN — GABAPENTIN 400 MG: 400 CAPSULE ORAL at 16:58

## 2024-12-03 RX ADMIN — IPRATROPIUM BROMIDE AND ALBUTEROL SULFATE 1 DOSE: 2.5; .5 SOLUTION RESPIRATORY (INHALATION) at 23:50

## 2024-12-03 RX ADMIN — GABAPENTIN 400 MG: 400 CAPSULE ORAL at 08:16

## 2024-12-03 RX ADMIN — HYDROCODONE BITARTRATE AND HOMATROPINE METHYLBROMIDE 5 ML: 5; 1.5 SOLUTION ORAL at 11:14

## 2024-12-03 RX ADMIN — ROSUVASTATIN CALCIUM 10 MG: 10 TABLET, FILM COATED ORAL at 21:41

## 2024-12-03 RX ADMIN — BUDESONIDE 1000 MCG: 0.5 SUSPENSION RESPIRATORY (INHALATION) at 20:00

## 2024-12-03 RX ADMIN — BUDESONIDE 1000 MCG: 0.5 SUSPENSION RESPIRATORY (INHALATION) at 08:39

## 2024-12-03 RX ADMIN — HYDROCODONE BITARTRATE AND ACETAMINOPHEN 1 TABLET: 5; 325 TABLET ORAL at 14:35

## 2024-12-03 RX ADMIN — IPRATROPIUM BROMIDE AND ALBUTEROL SULFATE 1 DOSE: 2.5; .5 SOLUTION RESPIRATORY (INHALATION) at 15:51

## 2024-12-03 RX ADMIN — CLONAZEPAM 0.5 MG: 0.5 TABLET ORAL at 16:58

## 2024-12-03 RX ADMIN — METHYLPREDNISOLONE SODIUM SUCCINATE 60 MG: 125 INJECTION INTRAMUSCULAR; INTRAVENOUS at 22:45

## 2024-12-03 RX ADMIN — ARFORMOTEROL TARTRATE 15 MCG: 15 SOLUTION RESPIRATORY (INHALATION) at 20:00

## 2024-12-03 RX ADMIN — PROPOFOL 160 MG: 10 INJECTION, EMULSION INTRAVENOUS at 13:18

## 2024-12-03 RX ADMIN — SODIUM CHLORIDE, PRESERVATIVE FREE 10 ML: 5 INJECTION INTRAVENOUS at 08:16

## 2024-12-03 RX ADMIN — IPRATROPIUM BROMIDE AND ALBUTEROL SULFATE 1 DOSE: 2.5; .5 SOLUTION RESPIRATORY (INHALATION) at 03:40

## 2024-12-03 RX ADMIN — SODIUM CHLORIDE, PRESERVATIVE FREE 10 ML: 5 INJECTION INTRAVENOUS at 21:42

## 2024-12-03 RX ADMIN — IPRATROPIUM BROMIDE AND ALBUTEROL SULFATE 1 DOSE: 2.5; .5 SOLUTION RESPIRATORY (INHALATION) at 00:17

## 2024-12-03 RX ADMIN — CLONAZEPAM 0.5 MG: 0.5 TABLET ORAL at 00:12

## 2024-12-03 RX ADMIN — GABAPENTIN 400 MG: 400 CAPSULE ORAL at 21:41

## 2024-12-03 RX ADMIN — METHYLPREDNISOLONE SODIUM SUCCINATE 60 MG: 125 INJECTION INTRAMUSCULAR; INTRAVENOUS at 11:09

## 2024-12-03 RX ADMIN — DULOXETINE HYDROCHLORIDE 60 MG: 60 CAPSULE, DELAYED RELEASE ORAL at 08:16

## 2024-12-03 RX ADMIN — ARFORMOTEROL TARTRATE 15 MCG: 15 SOLUTION RESPIRATORY (INHALATION) at 08:39

## 2024-12-03 RX ADMIN — IPRATROPIUM BROMIDE AND ALBUTEROL SULFATE 1 DOSE: 2.5; .5 SOLUTION RESPIRATORY (INHALATION) at 11:54

## 2024-12-03 RX ADMIN — ENOXAPARIN SODIUM 40 MG: 100 INJECTION SUBCUTANEOUS at 08:15

## 2024-12-03 RX ADMIN — HYDROCODONE BITARTRATE AND ACETAMINOPHEN 1 TABLET: 5; 325 TABLET ORAL at 21:45

## 2024-12-03 ASSESSMENT — PAIN - FUNCTIONAL ASSESSMENT
PAIN_FUNCTIONAL_ASSESSMENT: ACTIVITIES ARE NOT PREVENTED

## 2024-12-03 ASSESSMENT — PAIN DESCRIPTION - DESCRIPTORS
DESCRIPTORS: ACHING;GNAWING;SORE
DESCRIPTORS: ACHING;DISCOMFORT
DESCRIPTORS: ACHING;TIGHTNESS;SQUEEZING

## 2024-12-03 ASSESSMENT — PAIN DESCRIPTION - LOCATION
LOCATION: MOUTH
LOCATION: OTHER (COMMENT)
LOCATION: BACK

## 2024-12-03 ASSESSMENT — PAIN SCALES - GENERAL
PAINLEVEL_OUTOF10: 10
PAINLEVEL_OUTOF10: 0
PAINLEVEL_OUTOF10: 7
PAINLEVEL_OUTOF10: 5
PAINLEVEL_OUTOF10: 0
PAINLEVEL_OUTOF10: 0

## 2024-12-03 ASSESSMENT — PAIN DESCRIPTION - PAIN TYPE: TYPE: ACUTE PAIN

## 2024-12-03 ASSESSMENT — PAIN DESCRIPTION - FREQUENCY: FREQUENCY: INTERMITTENT

## 2024-12-03 ASSESSMENT — ENCOUNTER SYMPTOMS
DYSPNEA ACTIVITY LEVEL: NO INTERVAL CHANGE
SHORTNESS OF BREATH: 1

## 2024-12-03 ASSESSMENT — LIFESTYLE VARIABLES: SMOKING_STATUS: 0

## 2024-12-03 ASSESSMENT — COPD QUESTIONNAIRES: CAT_SEVERITY: MODERATE

## 2024-12-03 ASSESSMENT — PAIN DESCRIPTION - ORIENTATION: ORIENTATION: UPPER

## 2024-12-03 ASSESSMENT — PAIN DESCRIPTION - ONSET: ONSET: GRADUAL

## 2024-12-03 NOTE — PROGRESS NOTES
[facility]                                Progress note    Subjective:    In endo for bronch during rounds   Objective:    /67   Pulse 65   Temp 97.9 °F (36.6 °C) (Temporal)   Resp 16   Ht 1.6 m (5' 3\")   Wt 76.6 kg (168 lb 14 oz)   SpO2 95%   BMI 29.91 kg/m²     No intake/output data recorded.  No intake/output data recorded.       No results for input(s): \"WBC\", \"HGB\", \"HCT\", \"PLT\" in the last 72 hours.    Recent Labs     12/02/24  0813 12/03/24  0425    133   K 4.2 4.0   CL 99 99   CO2 27 28   BUN 20 19   CREATININE 0.7 0.8   CALCIUM 8.7 8.2*       Assessment:    Principal Problem:    COPD exacerbation (HCC)  Active Problems:    Mucus plugging of bronchi  Resolved Problems:    * No resolved hospital problems. *      Plan:    Patient is a 64-year-old female admitted to [facility] for  COPD exacerbation.  -Monitor labs  -proBNP 83  -Respiratory panel negative  -D-dimer 586  -Increase IV Solu-Medrol 40 mg every 6 hours-  -Consult pulmonology, she needs to be established with a lung doctor as she has longstanding COPD and coarse breath sounds, impending respiratory failure acutely  -Scheduled breathing treatments  -She stopped smoking or slow down she tells me about 2 weeks ago-does not wear oxygen at home  -Check ambulatory pulse ox once breathing status is improved  -Continue scheduled breathing treatments  -Currently utilizing 4 L nasal cannula satting 98%, wean supplemental O2 as able     11/26/2024  -Monitor labs  -Heart rate and blood pressure stable  -Remains on 4 L nasal cannula satting 94%  -Norco added for pain as needed  -88% on 4 L during walking pulse ox   -Continue 60 mg IV solu-medrol q6h per pulmonary   -Continue scheduled breathing treatments  -Continue EZ Pap and flutter valve  -Will require PFTs outpatient  -Continue to monitor respiratory status     11/27/2024  -Monitor labs  -Heart rate and blood pressure stable  -Remains on 4 L nasal cannula satting

## 2024-12-03 NOTE — PROGRESS NOTES
Pulmonary Progress Note    Admit Date: 2024  Hospital day                               PCP: Camryn Arriaga PA-C    Chief Complaint (s):  Patient Active Problem List   Diagnosis    Fibromyalgia    Anxiety    Chronic prescription opiate use    Smoker    Primary insomnia    Chronic pain syndrome [G89.4 (ICD-10-CM)]    Cervicalgia    Chronic bilateral low back pain without sciatica    COPD exacerbation (HCC)    Mucus plugging of bronchi       Subjective:  Patient seen on 2 L nasal cannula. She does feel good. She admits not having a nebulizer for home. Her cough continues.       Vitals:  VITALS:  /64   Pulse 77   Temp 97.6 °F (36.4 °C) (Oral)   Resp 20   Ht 1.6 m (5' 3\")   Wt 76.6 kg (168 lb 14 oz)   SpO2 95%   BMI 29.91 kg/m²     24HR INTAKE/OUTPUT:    No intake or output data in the 24 hours ending 24 1125      24HR PULSE OXIMETRY RANGE:    SpO2  Av.4 %  Min: 94 %  Max: 98 %      Medications:  IV:   sodium chloride         Scheduled Meds:   methylPREDNISolone  60 mg IntraVENous Q12H    budesonide  1 mg Nebulization BID RT    ipratropium 0.5 mg-albuterol 2.5 mg  1 Dose Inhalation Q4H RT    arformoterol tartrate  15 mcg Nebulization BID RT    sodium chloride flush  5-40 mL IntraVENous 2 times per day    enoxaparin  40 mg SubCUTAneous Daily    DULoxetine  60 mg Oral Daily    gabapentin  400 mg Oral 4x Daily    rosuvastatin  10 mg Oral Nightly       Diet:   Diet NPO Exceptions are: Sips of Water with Meds         EXAM:  General: No distress. Alert.  Eyes: PERRL. No sclera icterus. No conjunctival injection.  ENT: No discharge. Pharynx clear.  Neck: Trachea midline. Normal thyroid.  Resp: No accessory muscle use. no rales. no wheezing. biasilar rhonchi.   CV: Regular rate. Regular rhythm. No murmur or rub.   Abd: Non-tender. Non-distended. No masses. No organomegaly. Normal bowel sounds.   Skin: Warm and dry. No nodule on exposed extremities. No rash on exposed extremities.  Ext: No

## 2024-12-03 NOTE — PROGRESS NOTES
Nursing Transfer Note    Data:  Summary of patients progress: s/p bronchoscopy  Reason for transfer: inpatient hospital stay - pacu discharge criteria met    Action:  Explained reason for transfer to Patient.  Report given to: Christian singh RN, using RN Handoff Navigator.  Mode of transportation: cart    Response:  RN Recommendations: see notes/orders/MAR

## 2024-12-03 NOTE — ANESTHESIA PRE PROCEDURE
Department of Anesthesiology  Preprocedure Note       Name:  Tiffany Valentine   Age:  64 y.o.  :  1960                                          MRN:  [id number]         Date:  12/3/2024      Surgeon: Surgeon(s):  Simone Rose MD    Procedure: Procedure(s):  BRONCHOSCOPY    Medications prior to admission:   Prior to Admission medications    Medication Sig Start Date End Date Taking? Authorizing Provider   HYDROcodone-acetaminophen (NORCO) 5-325 MG per tablet Take 1 tablet by mouth 3 times daily as needed for Pain. 24  Yes Provider, MD Moe   DULoxetine (CYMBALTA) 60 MG extended release capsule Take 1 capsule by mouth daily 10/24/24  Yes Provider, MD Moe   rosuvastatin (CRESTOR) 10 MG tablet Take 1 tablet by mouth at bedtime   Yes Provider, MD Moe   gabapentin (NEURONTIN) 400 MG capsule Take 1 capsule by mouth 4 times daily for 90 days. 22 Yes Librado Farmer MD   clonazePAM (KLONOPIN) 0.5 MG tablet Take 1 tablet by mouth 2 times daily as needed for Anxiety for up to 90 days. 22 Yes Librado Farmer MD   albuterol sulfate HFA (VENTOLIN HFA) 108 (90 Base) MCG/ACT inhaler Inhale 2 puffs into the lungs every 4 hours as needed for Wheezing 20  Yes Zurdo Rose PA       Current medications:    Current Facility-Administered Medications   Medication Dose Route Frequency Provider Last Rate Last Admin    methylPREDNISolone sodium succ (SOLU-MEDROL) injection 60 mg  60 mg IntraVENous Q12H Terri Perez APRN - NP   60 mg at 24 1109    HYDROcodone-acetaminophen (NORCO) 5-325 MG per tablet 1 tablet  1 tablet Oral TID PRN Dana Wang APRN - CNP   1 tablet at 24 0208    budesonide (PULMICORT) nebulizer suspension 1,000 mcg  1 mg Nebulization BID RT Terri Perez APRN - NP   1,000 mcg at 24 0839    ipratropium 0.5 mg-albuterol 2.5 mg (DUONEB) nebulizer solution 1 Dose  1 Dose Inhalation Q4H RT Gloria Vgea APRN - CNP

## 2024-12-03 NOTE — PROGRESS NOTES
[facility] Quality Flow/Interdisciplinary Rounds Progress Note        Quality Flow Rounds held on December 3, 2024    Disciplines Attending:  Bedside Nurse, , , and Nursing Unit Leadership    Tiffany Valentine was admitted on 11/24/2024  3:42 AM    Anticipated Discharge Date:  Expected Discharge Date: 11/29/24    Disposition:    Jose Score:  Jose Scale Score: 21    Readmission Risk              Risk of Unplanned Readmission:  14           Discussed patient goal for the day, patient clinical progression, and barriers to discharge.  The following Goal(s) of the Day/Commitment(s) have been identified:   iv steroids, ambulate oxygen testing, bronch today wean oxygen as able.       Iesha Estrada RN  December 3, 2024

## 2024-12-03 NOTE — ANESTHESIA POSTPROCEDURE EVALUATION
Department of Anesthesiology  Postprocedure Note    Patient: Tiffany Valentine  MRN: [id number]  YOB: 1960  Date of evaluation: 12/3/2024    Procedure Summary       Date: 12/03/24 Room / Location: [place] [address] / [facility]    Anesthesia Start: 1315 Anesthesia Stop: 1331    Procedure: BRONCHOSCOPY DIAGNOSTIC OR CELL WASH ONLY (Bilateral) Diagnosis:       Mucus plugging of bronchi      (Mucus plugging of bronchi [T17.500A])    Surgeons: Simone Rose MD Responsible Provider: Werner Greene DO    Anesthesia Type: MAC ASA Status: 3            Anesthesia Type: No value filed.    Sparkle Phase I:      Sparkle Phase II:      Anesthesia Post Evaluation    Patient location during evaluation: PACU  Patient participation: complete - patient participated  Level of consciousness: awake  Pain score: 0  Airway patency: patent  Nausea & Vomiting: no nausea and no vomiting  Cardiovascular status: blood pressure returned to baseline and hemodynamically stable  Respiratory status: acceptable  Hydration status: euvolemic  Comments: Patient seen and examined.  Progressing as expected.  Patient notified of tooth fragment found after mouthguard removal in endoscopy.  Patient not surprised given poor overall dentition and caries.  Pain management: adequate      No notable events documented.

## 2024-12-03 NOTE — CARE COORDINATION
Social Work / Discharge Planning : KELLEY followed up with patient and finalized discharge planning. Patient scheduled to have a bronch today with the goal to return HOME tomorrow. Patient states she has been getting up and moving and no issues reported with mobility. Plan HOME. If patient needs 02 and not able to wean to BL then Kira Cam following. Will need pulse ox testing and DME order with qualifying dx. If patient is discharged on nebulizer meds, WILL NEED DME for NEBULIZER. Kira can provide as well.  Patient has transportation home. NO other needs. SW to follow. Electronically signed by DANAN Pelayo on 12/3/24 at 9:53 AM EST

## 2024-12-03 NOTE — PLAN OF CARE
Problem: Safety - Adult  Goal: Free from fall injury  12/3/2024 0940 by Eve Nunez RN  Outcome: Progressing  12/3/2024 0439 by Kenneth Gandhi RN  Outcome: Progressing  Flowsheets (Taken 12/3/2024 0439)  Free From Fall Injury: Instruct family/caregiver on patient safety  12/3/2024 0031 by Berta Gordon RN  Outcome: Progressing     Problem: Pain  Goal: Verbalizes/displays adequate comfort level or baseline comfort level  12/3/2024 0940 by Eve Nunez RN  Outcome: Progressing  12/3/2024 0439 by Kenneth Gandhi RN  Outcome: Progressing  Flowsheets (Taken 12/3/2024 0439)  Verbalizes/displays adequate comfort level or baseline comfort level:   Encourage patient to monitor pain and request assistance   Administer analgesics based on type and severity of pain and evaluate response   Assess pain using appropriate pain scale   Implement non-pharmacological measures as appropriate and evaluate response  12/3/2024 0031 by Berta Gordon RN  Outcome: Progressing     Problem: Discharge Planning  Goal: Discharge to home or other facility with appropriate resources  12/3/2024 0940 by Eve Nunez RN  Outcome: Progressing  12/3/2024 0031 by Berta Gordon RN  Outcome: Progressing     Problem: Chronic Conditions and Co-morbidities  Goal: Patient's chronic conditions and co-morbidity symptoms are monitored and maintained or improved  12/3/2024 0940 by Eve Nunez RN  Outcome: Progressing  12/3/2024 0439 by Kenneth Gandhi RN  Outcome: Progressing  Flowsheets (Taken 12/3/2024 0439)  Care Plan - Patient's Chronic Conditions and Co-Morbidity Symptoms are Monitored and Maintained or Improved:   Monitor and assess patient's chronic conditions and comorbid symptoms for stability, deterioration, or improvement   Collaborate with multidisciplinary team to address chronic and comorbid conditions and prevent exacerbation or deterioration   Update acute care plan with appropriate goals if chronic or comorbid  symptoms are exacerbated and prevent overall improvement and discharge  12/3/2024 0031 by Berta Gordon, RN  Outcome: Progressing

## 2024-12-03 NOTE — PLAN OF CARE
Problem: Safety - Adult  Goal: Free from fall injury  12/3/2024 0439 by Kenneth Gandhi, RN  Outcome: Progressing  Flowsheets (Taken 12/3/2024 0439)  Free From Fall Injury: Instruct family/caregiver on patient safety     Problem: Pain  Goal: Verbalizes/displays adequate comfort level or baseline comfort level  12/3/2024 0439 by Kenneth Gandhi, RN  Outcome: Progressing  Flowsheets (Taken 12/3/2024 0439)  Verbalizes/displays adequate comfort level or baseline comfort level:   Encourage patient to monitor pain and request assistance   Administer analgesics based on type and severity of pain and evaluate response   Assess pain using appropriate pain scale   Implement non-pharmacological measures as appropriate and evaluate response     Problem: Chronic Conditions and Co-morbidities  Goal: Patient's chronic conditions and co-morbidity symptoms are monitored and maintained or improved  12/3/2024 0439 by Kenneth Gandhi, RN  Outcome: Progressing  Flowsheets (Taken 12/3/2024 0439)  Care Plan - Patient's Chronic Conditions and Co-Morbidity Symptoms are Monitored and Maintained or Improved:   Monitor and assess patient's chronic conditions and comorbid symptoms for stability, deterioration, or improvement   Collaborate with multidisciplinary team to address chronic and comorbid conditions and prevent exacerbation or deterioration   Update acute care plan with appropriate goals if chronic or comorbid symptoms are exacerbated and prevent overall improvement and discharge

## 2024-12-04 LAB
ALBUMIN SERPL-MCNC: 3.2 G/DL (ref 3.5–5.2)
ALP SERPL-CCNC: 40 U/L (ref 35–104)
ALT SERPL-CCNC: 23 U/L (ref 0–32)
ANION GAP SERPL CALCULATED.3IONS-SCNC: 9 MMOL/L (ref 7–16)
AST SERPL-CCNC: 13 U/L (ref 0–31)
BILIRUB SERPL-MCNC: 0.3 MG/DL (ref 0–1.2)
BUN SERPL-MCNC: 19 MG/DL (ref 6–23)
CALCIUM SERPL-MCNC: 8.3 MG/DL (ref 8.6–10.2)
CHLORIDE SERPL-SCNC: 100 MMOL/L (ref 98–107)
CO2 SERPL-SCNC: 26 MMOL/L (ref 22–29)
CREAT SERPL-MCNC: 0.7 MG/DL (ref 0.5–1)
GFR, ESTIMATED: >90 ML/MIN/1.73M2
GLUCOSE SERPL-MCNC: 154 MG/DL (ref 74–99)
POTASSIUM SERPL-SCNC: 4.6 MMOL/L (ref 3.5–5)
PROT SERPL-MCNC: 5.2 G/DL (ref 6.4–8.3)
SODIUM SERPL-SCNC: 135 MMOL/L (ref 132–146)

## 2024-12-04 PROCEDURE — 36415 COLL VENOUS BLD VENIPUNCTURE: CPT

## 2024-12-04 PROCEDURE — 6370000000 HC RX 637 (ALT 250 FOR IP): Performed by: INTERNAL MEDICINE

## 2024-12-04 PROCEDURE — 2060000000 HC ICU INTERMEDIATE R&B

## 2024-12-04 PROCEDURE — 94669 MECHANICAL CHEST WALL OSCILL: CPT

## 2024-12-04 PROCEDURE — 6370000000 HC RX 637 (ALT 250 FOR IP): Performed by: NURSE PRACTITIONER

## 2024-12-04 PROCEDURE — 6360000002 HC RX W HCPCS: Performed by: FAMILY MEDICINE

## 2024-12-04 PROCEDURE — 6360000002 HC RX W HCPCS

## 2024-12-04 PROCEDURE — 6360000002 HC RX W HCPCS: Performed by: INTERNAL MEDICINE

## 2024-12-04 PROCEDURE — 6370000000 HC RX 637 (ALT 250 FOR IP): Performed by: FAMILY MEDICINE

## 2024-12-04 PROCEDURE — 94667 MNPJ CHEST WALL 1ST: CPT

## 2024-12-04 PROCEDURE — 2700000000 HC OXYGEN THERAPY PER DAY

## 2024-12-04 PROCEDURE — 94640 AIRWAY INHALATION TREATMENT: CPT

## 2024-12-04 PROCEDURE — 2580000003 HC RX 258: Performed by: FAMILY MEDICINE

## 2024-12-04 PROCEDURE — 6370000000 HC RX 637 (ALT 250 FOR IP)

## 2024-12-04 PROCEDURE — 80053 COMPREHEN METABOLIC PANEL: CPT

## 2024-12-04 RX ORDER — PREDNISONE 20 MG/1
40 TABLET ORAL DAILY
Status: DISCONTINUED | OUTPATIENT
Start: 2024-12-04 | End: 2024-12-05 | Stop reason: HOSPADM

## 2024-12-04 RX ADMIN — ARFORMOTEROL TARTRATE 15 MCG: 15 SOLUTION RESPIRATORY (INHALATION) at 19:49

## 2024-12-04 RX ADMIN — SODIUM CHLORIDE, PRESERVATIVE FREE 10 ML: 5 INJECTION INTRAVENOUS at 08:45

## 2024-12-04 RX ADMIN — IPRATROPIUM BROMIDE AND ALBUTEROL SULFATE 1 DOSE: 2.5; .5 SOLUTION RESPIRATORY (INHALATION) at 12:31

## 2024-12-04 RX ADMIN — HYDROCODONE BITARTRATE AND ACETAMINOPHEN 1 TABLET: 5; 325 TABLET ORAL at 04:06

## 2024-12-04 RX ADMIN — ARFORMOTEROL TARTRATE 15 MCG: 15 SOLUTION RESPIRATORY (INHALATION) at 09:12

## 2024-12-04 RX ADMIN — IPRATROPIUM BROMIDE AND ALBUTEROL SULFATE 1 DOSE: 2.5; .5 SOLUTION RESPIRATORY (INHALATION) at 23:48

## 2024-12-04 RX ADMIN — IPRATROPIUM BROMIDE AND ALBUTEROL SULFATE 1 DOSE: 2.5; .5 SOLUTION RESPIRATORY (INHALATION) at 19:49

## 2024-12-04 RX ADMIN — BUDESONIDE 1000 MCG: 0.5 SUSPENSION RESPIRATORY (INHALATION) at 09:12

## 2024-12-04 RX ADMIN — SODIUM CHLORIDE, PRESERVATIVE FREE 10 ML: 5 INJECTION INTRAVENOUS at 21:07

## 2024-12-04 RX ADMIN — GABAPENTIN 400 MG: 400 CAPSULE ORAL at 20:53

## 2024-12-04 RX ADMIN — HYDROCODONE BITARTRATE AND HOMATROPINE METHYLBROMIDE 5 ML: 5; 1.5 SOLUTION ORAL at 08:44

## 2024-12-04 RX ADMIN — GABAPENTIN 400 MG: 400 CAPSULE ORAL at 08:45

## 2024-12-04 RX ADMIN — HYDROCODONE BITARTRATE AND ACETAMINOPHEN 1 TABLET: 5; 325 TABLET ORAL at 19:20

## 2024-12-04 RX ADMIN — HYDROCODONE BITARTRATE AND ACETAMINOPHEN 1 TABLET: 5; 325 TABLET ORAL at 10:51

## 2024-12-04 RX ADMIN — PREDNISONE 40 MG: 20 TABLET ORAL at 12:20

## 2024-12-04 RX ADMIN — DULOXETINE HYDROCHLORIDE 60 MG: 60 CAPSULE, DELAYED RELEASE ORAL at 08:45

## 2024-12-04 RX ADMIN — IPRATROPIUM BROMIDE AND ALBUTEROL SULFATE 1 DOSE: 2.5; .5 SOLUTION RESPIRATORY (INHALATION) at 03:55

## 2024-12-04 RX ADMIN — IPRATROPIUM BROMIDE AND ALBUTEROL SULFATE 1 DOSE: 2.5; .5 SOLUTION RESPIRATORY (INHALATION) at 09:12

## 2024-12-04 RX ADMIN — BUDESONIDE 1000 MCG: 0.5 SUSPENSION RESPIRATORY (INHALATION) at 19:49

## 2024-12-04 RX ADMIN — GABAPENTIN 400 MG: 400 CAPSULE ORAL at 12:30

## 2024-12-04 RX ADMIN — GABAPENTIN 400 MG: 400 CAPSULE ORAL at 16:00

## 2024-12-04 RX ADMIN — ROSUVASTATIN CALCIUM 10 MG: 10 TABLET, FILM COATED ORAL at 20:53

## 2024-12-04 RX ADMIN — CLONAZEPAM 0.5 MG: 0.5 TABLET ORAL at 21:06

## 2024-12-04 RX ADMIN — ENOXAPARIN SODIUM 40 MG: 100 INJECTION SUBCUTANEOUS at 08:45

## 2024-12-04 RX ADMIN — IPRATROPIUM BROMIDE AND ALBUTEROL SULFATE 1 DOSE: 2.5; .5 SOLUTION RESPIRATORY (INHALATION) at 15:29

## 2024-12-04 ASSESSMENT — PAIN SCALES - GENERAL
PAINLEVEL_OUTOF10: 0
PAINLEVEL_OUTOF10: 7
PAINLEVEL_OUTOF10: 10
PAINLEVEL_OUTOF10: 8

## 2024-12-04 ASSESSMENT — PAIN DESCRIPTION - DESCRIPTORS
DESCRIPTORS: THROBBING;ACHING
DESCRIPTORS: ACHING;DISCOMFORT

## 2024-12-04 ASSESSMENT — PAIN - FUNCTIONAL ASSESSMENT: PAIN_FUNCTIONAL_ASSESSMENT: PREVENTS OR INTERFERES SOME ACTIVE ACTIVITIES AND ADLS

## 2024-12-04 ASSESSMENT — PAIN DESCRIPTION - LOCATION
LOCATION: TEETH
LOCATION: OTHER (COMMENT)
LOCATION: TEETH

## 2024-12-04 NOTE — PROGRESS NOTES
[facility] Quality Flow/Interdisciplinary Rounds Progress Note        Quality Flow Rounds held on December 4, 2024    Disciplines Attending:  Bedside Nurse, , , and Nursing Unit Leadership    Tiffany Valentine was admitted on 11/24/2024  3:42 AM    Anticipated Discharge Date:  Expected Discharge Date: 11/29/24    Disposition:    Jose Score:  Jose Scale Score: 21    Readmission Risk              Risk of Unplanned Readmission:  14           Discussed patient goal for the day, patient clinical progression, and barriers to discharge.  The following Goal(s) of the Day/Commitment(s) have been identified:   iv steroids, ambulate oxygen       Iesha Estrada RN  December 4, 2024

## 2024-12-04 NOTE — PROGRESS NOTES
Pulmonary Progress Note    Admit Date: 2024  Hospital day                               PCP: Camryn Arriaga PA-C    Chief Complaint (s):  Patient Active Problem List   Diagnosis    Fibromyalgia    Anxiety    Chronic prescription opiate use    Smoker    Primary insomnia    Chronic pain syndrome [G89.4 (ICD-10-CM)]    Cervicalgia    Chronic bilateral low back pain without sciatica    COPD exacerbation (HCC)    Mucus plugging of bronchi       Subjective:  Patient seen on room air. She feels significant improvement since bronchoscopy yesterday. Residual cough with productive secretions.       Vitals:  VITALS:  BP 99/60   Pulse 66   Temp 98.5 °F (36.9 °C) (Oral)   Resp 18   Ht 1.6 m (5' 3\")   Wt 76.6 kg (168 lb 14 oz)   SpO2 98%   BMI 29.91 kg/m²     24HR INTAKE/OUTPUT:    No intake or output data in the 24 hours ending 24 1050      24HR PULSE OXIMETRY RANGE:    SpO2  Av %  Min: 94 %  Max: 99 %      Medications:  IV:   sodium chloride         Scheduled Meds:   methylPREDNISolone  60 mg IntraVENous Q12H    budesonide  1 mg Nebulization BID RT    ipratropium 0.5 mg-albuterol 2.5 mg  1 Dose Inhalation Q4H RT    arformoterol tartrate  15 mcg Nebulization BID RT    sodium chloride flush  5-40 mL IntraVENous 2 times per day    enoxaparin  40 mg SubCUTAneous Daily    DULoxetine  60 mg Oral Daily    gabapentin  400 mg Oral 4x Daily    rosuvastatin  10 mg Oral Nightly       Diet:   ADULT DIET; Regular         EXAM:  General: No distress. Alert.  Eyes: PERRL. No sclera icterus. No conjunctival injection.  ENT: No discharge. Pharynx clear.  Neck: Trachea midline. Normal thyroid.  Resp: No accessory muscle use. no rales. no wheezing. no rhonchi.   CV: Regular rate. Regular rhythm. No murmur or rub.   Abd: Non-tender. Non-distended. No masses. No organomegaly. Normal bowel sounds.   Skin: Warm and dry. No nodule on exposed extremities. No rash on exposed extremities.  Ext: No cyanosis, clubbing,  Prednisone  Continue aerosols. DME for nebulizer for home to treat COPD.   Chest vest  EZ pap  Wean supplemental oxygen with goal SpO2 88%-92%. Check ambulatory pulse oximetry.   PFT outpatient.       Time at the bedside, reviewing labs and radiographs, reviewing updated notes and consultations, discussing with staff and family was more than 50 minutes.    Please note that voice recognition technology was used in the preparation of this note and make therefore it may contain inadvertent transcription errors.  If the patient is a COVID 19 isolation patient, the above physical exam reflects that of the examining physician for the day.        ROCIO Centeno-SERGEI  Attestation    The patient was seen and personally examined.  History is obtained through the patient by myself.  Impression and plan are mine.    No complications from yesterday's procedure.  Much better as a result.  It appears that the secretions will grow oral pharyngeal janay.  No treatment is indicated for that.  Likely discharge tomorrow    Documentation only provided per the nurse practitioner.    Additions and corrections are reflected in the signed note.    Simone Rose MD,  M.D., F.C.C.P.    [facility]    [facility], [address] [place] [address], [address], [place] [address]  Office visits:  [address] [place] [address]

## 2024-12-04 NOTE — PROGRESS NOTES
[facility]                                Progress note    Subjective:    The patient resting comfortably in bed without oxygen  Denies any discomfort     Objective:    BP (!) 105/59   Pulse 70   Temp 98.3 °F (36.8 °C) (Oral)   Resp 18   Ht 1.6 m (5' 3\")   Wt 76.6 kg (168 lb 14 oz)   SpO2 98%   BMI 29.91 kg/m²     No intake/output data recorded.  No intake/output data recorded.    General appearance: NAD, conversant, appears to be working hard to rate on my evaluation today  Eyes: Sclerae anicteric, PERRLA  HEENT: AT/NC, MMM  Neck: FROM, supple, no thyromegaly  Lymph: No cervical / supraclavicular lymphadenopathy  Lungs: diminished, room air  CV: RRR, no MRGs, no lower extremity edema  Abdomen: Soft, non-tender; no masses or HSM, +BS  Extremities: FROM without synovitis.  No clubbing or cyanosis of the hands.  Skin: no rash, induration, lesions, or ulcers  Psych: Calm and cooperative.  Normal judgement and insight.  Normal mood and affect.  Neuro: Alert and interactive, face symmetric, speech fluent     No results for input(s): \"WBC\", \"HGB\", \"HCT\", \"PLT\" in the last 72 hours.    Recent Labs     12/02/24  0813 12/03/24  0425 12/04/24  0444    133 135   K 4.2 4.0 4.6   CL 99 99 100   CO2 27 28 26   BUN 20 19 19   CREATININE 0.7 0.8 0.7   CALCIUM 8.7 8.2* 8.3*       Assessment:    Principal Problem:    COPD exacerbation (HCC)  Active Problems:    Mucus plugging of bronchi  Resolved Problems:    * No resolved hospital problems. *      Plan:    Patient is a 64-year-old female admitted to [facility] for  COPD exacerbation.  -Monitor labs  -proBNP 83  -Respiratory panel negative  -D-dimer 586  -Increase IV Solu-Medrol 40 mg every 6 hours-  -Consult pulmonology, she needs to be established with a lung doctor as she has longstanding COPD and coarse breath sounds, impending respiratory failure acutely  -Scheduled breathing treatments  -She stopped smoking or slow down she tells me about 2  standpoint if okay with pulm     12/2/2024  -Monitor labs   -Satting 97% on 2 L wean supplemental oxygen   -Solu-medrol weaned yesterday evening to 60 mg BID per pulmonology  -Ambulatory pulse ox testing  -Await nebulizer needs on discharge per pulmonology     12/3/24  -Bronch today  -Ambulatory pulse ox testing completed yesterday revealing no needs for supplemental O2 on ambulation or while at rest  -Discharge planning home with likely need for nebulizers however will defer to pulmonology for ordering and management  -Discharge home when okay with pulmonology     12/4/24  -Monitor Labs  -s/p bronch   -On Room air   -Start Prednisone 40 mg taper per pulm   -Discharge home when okay with pulmonology  -Nebulizers on discharge at the discretion of pulmonology       DVT prophylaxis Lovenox  PT OT  Discharge planning           ROCIO Anthony - CNP  2:09 PM  12/4/2024

## 2024-12-04 NOTE — PLAN OF CARE
Problem: Safety - Adult  Goal: Free from fall injury  12/4/2024 1126 by Jewel Valenzuela RN  Outcome: Progressing  12/3/2024 2327 by Berta Gordon RN  Outcome: Progressing     Problem: Pain  Goal: Verbalizes/displays adequate comfort level or baseline comfort level  12/4/2024 1126 by Jewel Valenzuela RN  Outcome: Progressing  12/3/2024 2327 by Berta Gordon RN  Outcome: Progressing     Problem: Discharge Planning  Goal: Discharge to home or other facility with appropriate resources  12/4/2024 1126 by Jewel Valenzuela RN  Outcome: Progressing  12/3/2024 2327 by Berta Gordon RN  Outcome: Progressing     Problem: Chronic Conditions and Co-morbidities  Goal: Patient's chronic conditions and co-morbidity symptoms are monitored and maintained or improved  12/4/2024 1126 by Jewel Valenzuela RN  Outcome: Progressing  12/3/2024 2327 by Berta Gordon RN  Outcome: Progressing

## 2024-12-04 NOTE — CARE COORDINATION
Social Work / Discharge Planning : Plan HOME. If patient needs 02 and not able to wean to BL then Kevin Cam will NEED called ( [phone])  for delivery of 02 and also called for delivery of nebulizer. Will need pulse ox testing and DME order with qualifying dx: COPD. AWait testing. SW to follow. Electronically signed by DANNA Pelayo on 12/4/24 at 12:55 PM EST

## 2024-12-05 VITALS
HEART RATE: 72 BPM | BODY MASS INDEX: 29.92 KG/M2 | WEIGHT: 168.88 LBS | TEMPERATURE: 98.5 F | HEIGHT: 63 IN | OXYGEN SATURATION: 100 % | RESPIRATION RATE: 18 BRPM | SYSTOLIC BLOOD PRESSURE: 117 MMHG | DIASTOLIC BLOOD PRESSURE: 80 MMHG

## 2024-12-05 LAB
ALBUMIN SERPL-MCNC: 3.3 G/DL (ref 3.5–5.2)
ALP SERPL-CCNC: 34 U/L (ref 35–104)
ALT SERPL-CCNC: 26 U/L (ref 0–32)
ANION GAP SERPL CALCULATED.3IONS-SCNC: 10 MMOL/L (ref 7–16)
AST SERPL-CCNC: 16 U/L (ref 0–31)
BILIRUB SERPL-MCNC: 0.4 MG/DL (ref 0–1.2)
BUN SERPL-MCNC: 17 MG/DL (ref 6–23)
CALCIUM SERPL-MCNC: 8.2 MG/DL (ref 8.6–10.2)
CHLORIDE SERPL-SCNC: 101 MMOL/L (ref 98–107)
CO2 SERPL-SCNC: 29 MMOL/L (ref 22–29)
CREAT SERPL-MCNC: 0.7 MG/DL (ref 0.5–1)
GFR, ESTIMATED: >90 ML/MIN/1.73M2
GLUCOSE SERPL-MCNC: 83 MG/DL (ref 74–99)
MICROORGANISM SPEC CULT: ABNORMAL
MICROORGANISM SPEC CULT: ABNORMAL
MICROORGANISM/AGENT SPEC: ABNORMAL
POTASSIUM SERPL-SCNC: 4.4 MMOL/L (ref 3.5–5)
PROT SERPL-MCNC: 5.1 G/DL (ref 6.4–8.3)
SERVICE CMNT-IMP: ABNORMAL
SODIUM SERPL-SCNC: 140 MMOL/L (ref 132–146)
SPECIMEN DESCRIPTION: ABNORMAL

## 2024-12-05 PROCEDURE — 6360000002 HC RX W HCPCS: Performed by: INTERNAL MEDICINE

## 2024-12-05 PROCEDURE — 36415 COLL VENOUS BLD VENIPUNCTURE: CPT

## 2024-12-05 PROCEDURE — 0B9B8ZZ DRAINAGE OF LEFT LOWER LOBE BRONCHUS, VIA NATURAL OR ARTIFICIAL OPENING ENDOSCOPIC: ICD-10-PCS | Performed by: INTERNAL MEDICINE

## 2024-12-05 PROCEDURE — 6360000002 HC RX W HCPCS

## 2024-12-05 PROCEDURE — 6370000000 HC RX 637 (ALT 250 FOR IP): Performed by: INTERNAL MEDICINE

## 2024-12-05 PROCEDURE — 94669 MECHANICAL CHEST WALL OSCILL: CPT

## 2024-12-05 PROCEDURE — 6360000002 HC RX W HCPCS: Performed by: FAMILY MEDICINE

## 2024-12-05 PROCEDURE — 0B978ZZ DRAINAGE OF LEFT MAIN BRONCHUS, VIA NATURAL OR ARTIFICIAL OPENING ENDOSCOPIC: ICD-10-PCS | Performed by: INTERNAL MEDICINE

## 2024-12-05 PROCEDURE — 6370000000 HC RX 637 (ALT 250 FOR IP): Performed by: FAMILY MEDICINE

## 2024-12-05 PROCEDURE — 0B918ZZ DRAINAGE OF TRACHEA, VIA NATURAL OR ARTIFICIAL OPENING ENDOSCOPIC: ICD-10-PCS | Performed by: INTERNAL MEDICINE

## 2024-12-05 PROCEDURE — 94640 AIRWAY INHALATION TREATMENT: CPT

## 2024-12-05 PROCEDURE — 0B968ZZ DRAINAGE OF RIGHT LOWER LOBE BRONCHUS, VIA NATURAL OR ARTIFICIAL OPENING ENDOSCOPIC: ICD-10-PCS | Performed by: INTERNAL MEDICINE

## 2024-12-05 PROCEDURE — 0B998ZZ DRAINAGE OF LINGULA BRONCHUS, VIA NATURAL OR ARTIFICIAL OPENING ENDOSCOPIC: ICD-10-PCS | Performed by: INTERNAL MEDICINE

## 2024-12-05 PROCEDURE — 0B988ZZ DRAINAGE OF LEFT UPPER LOBE BRONCHUS, VIA NATURAL OR ARTIFICIAL OPENING ENDOSCOPIC: ICD-10-PCS | Performed by: INTERNAL MEDICINE

## 2024-12-05 PROCEDURE — 80053 COMPREHEN METABOLIC PANEL: CPT

## 2024-12-05 PROCEDURE — 6370000000 HC RX 637 (ALT 250 FOR IP): Performed by: NURSE PRACTITIONER

## 2024-12-05 PROCEDURE — 2580000003 HC RX 258: Performed by: FAMILY MEDICINE

## 2024-12-05 PROCEDURE — 0B948ZZ DRAINAGE OF RIGHT UPPER LOBE BRONCHUS, VIA NATURAL OR ARTIFICIAL OPENING ENDOSCOPIC: ICD-10-PCS | Performed by: INTERNAL MEDICINE

## 2024-12-05 PROCEDURE — 0B938ZZ DRAINAGE OF RIGHT MAIN BRONCHUS, VIA NATURAL OR ARTIFICIAL OPENING ENDOSCOPIC: ICD-10-PCS | Performed by: INTERNAL MEDICINE

## 2024-12-05 PROCEDURE — 6370000000 HC RX 637 (ALT 250 FOR IP)

## 2024-12-05 RX ORDER — IPRATROPIUM BROMIDE AND ALBUTEROL SULFATE 2.5; .5 MG/3ML; MG/3ML
3 SOLUTION RESPIRATORY (INHALATION) EVERY 4 HOURS PRN
Qty: 360 ML | Refills: 3 | Status: SHIPPED | OUTPATIENT
Start: 2024-12-05

## 2024-12-05 RX ORDER — PREDNISONE 10 MG/1
TABLET ORAL
Qty: 30 TABLET | Refills: 0 | Status: SHIPPED | OUTPATIENT
Start: 2024-12-05 | End: 2024-12-16

## 2024-12-05 RX ADMIN — DULOXETINE HYDROCHLORIDE 60 MG: 60 CAPSULE, DELAYED RELEASE ORAL at 08:10

## 2024-12-05 RX ADMIN — HYDROCODONE BITARTRATE AND HOMATROPINE METHYLBROMIDE 5 ML: 5; 1.5 SOLUTION ORAL at 00:29

## 2024-12-05 RX ADMIN — ARFORMOTEROL TARTRATE 15 MCG: 15 SOLUTION RESPIRATORY (INHALATION) at 08:37

## 2024-12-05 RX ADMIN — HYDROCODONE BITARTRATE AND HOMATROPINE METHYLBROMIDE 5 ML: 5; 1.5 SOLUTION ORAL at 14:35

## 2024-12-05 RX ADMIN — SODIUM CHLORIDE, PRESERVATIVE FREE 10 ML: 5 INJECTION INTRAVENOUS at 08:14

## 2024-12-05 RX ADMIN — PREDNISONE 40 MG: 20 TABLET ORAL at 08:09

## 2024-12-05 RX ADMIN — HYDROCODONE BITARTRATE AND ACETAMINOPHEN 1 TABLET: 5; 325 TABLET ORAL at 08:10

## 2024-12-05 RX ADMIN — GABAPENTIN 400 MG: 400 CAPSULE ORAL at 08:09

## 2024-12-05 RX ADMIN — IPRATROPIUM BROMIDE AND ALBUTEROL SULFATE 1 DOSE: 2.5; .5 SOLUTION RESPIRATORY (INHALATION) at 17:20

## 2024-12-05 RX ADMIN — BUDESONIDE 1000 MCG: 0.5 SUSPENSION RESPIRATORY (INHALATION) at 08:37

## 2024-12-05 RX ADMIN — GABAPENTIN 400 MG: 400 CAPSULE ORAL at 16:56

## 2024-12-05 RX ADMIN — ENOXAPARIN SODIUM 40 MG: 100 INJECTION SUBCUTANEOUS at 08:10

## 2024-12-05 RX ADMIN — IPRATROPIUM BROMIDE AND ALBUTEROL SULFATE 1 DOSE: 2.5; .5 SOLUTION RESPIRATORY (INHALATION) at 08:37

## 2024-12-05 RX ADMIN — IPRATROPIUM BROMIDE AND ALBUTEROL SULFATE 1 DOSE: 2.5; .5 SOLUTION RESPIRATORY (INHALATION) at 04:04

## 2024-12-05 RX ADMIN — IPRATROPIUM BROMIDE AND ALBUTEROL SULFATE 1 DOSE: 2.5; .5 SOLUTION RESPIRATORY (INHALATION) at 12:45

## 2024-12-05 RX ADMIN — CLONAZEPAM 0.5 MG: 0.5 TABLET ORAL at 16:56

## 2024-12-05 RX ADMIN — GABAPENTIN 400 MG: 400 CAPSULE ORAL at 12:19

## 2024-12-05 ASSESSMENT — PAIN DESCRIPTION - LOCATION: LOCATION: TEETH

## 2024-12-05 ASSESSMENT — PAIN DESCRIPTION - DESCRIPTORS: DESCRIPTORS: ACHING

## 2024-12-05 ASSESSMENT — PAIN DESCRIPTION - ORIENTATION: ORIENTATION: RIGHT

## 2024-12-05 NOTE — PLAN OF CARE
Problem: Safety - Adult  Goal: Free from fall injury  12/5/2024 1213 by Jewel Valenzuela RN  Outcome: Progressing  12/4/2024 2354 by Berta Gordon RN  Outcome: Progressing     Problem: Pain  Goal: Verbalizes/displays adequate comfort level or baseline comfort level  12/5/2024 1213 by Jewel Valenzuela RN  Outcome: Progressing  12/4/2024 2354 by Berta Gordon RN  Outcome: Progressing     Problem: Discharge Planning  Goal: Discharge to home or other facility with appropriate resources  12/5/2024 1213 by Jewel Valenzuela RN  Outcome: Progressing  12/4/2024 2354 by Berta Gordon RN  Outcome: Progressing     Problem: Chronic Conditions and Co-morbidities  Goal: Patient's chronic conditions and co-morbidity symptoms are monitored and maintained or improved  12/5/2024 1213 by Jewel Valenzuela RN  Outcome: Progressing  12/4/2024 2354 by Berta Gordon RN  Outcome: Progressing

## 2024-12-05 NOTE — CARE COORDINATION
Social Work / Discharge PLanning : Patient plan is HOME. Patient now on RA. Patient will need a nebulizer. SW ordered thorough [facility] at [facility] and she will deliver to patient room today. Nursing updated.SW to follow. Electronically signed by DANNA Pelayo on 12/5/24 at 9:17 AM EST

## 2024-12-05 NOTE — PROGRESS NOTES
[facility] Quality Flow/Interdisciplinary Rounds Progress Note        Quality Flow Rounds held on December 5, 2024    Disciplines Attending:  Bedside Nurse, , , and Nursing Unit Leadership    Tiffany Valentine was admitted on 11/24/2024  3:42 AM    Anticipated Discharge Date:  Expected Discharge Date: 12/04/24    Disposition:    Jose Score:  Jose Scale Score: 21    Readmission Risk              Risk of Unplanned Readmission:  14           Discussed patient goal for the day, patient clinical progression, and barriers to discharge.  The following Goal(s) of the Day/Commitment(s) have been identified:   discharge planning, pulm plan, PO steroids      Haile Aguilar RN  December 5, 2024

## 2024-12-05 NOTE — PROGRESS NOTES
clubbing, edema  Lymph: No cervical LAD. No supraclavicular LAD.   M/S: No cyanosis. No joint deformity. No clubbing.   Neuro: Awake. Follows commands. Positive pupils/gag/corneals. Normal pain response.       Results:  CBC:   No results for input(s): \"WBC\", \"HGB\", \"HCT\", \"MCV\", \"PLT\" in the last 72 hours.    BMP:   Recent Labs     12/03/24 0425 12/04/24 0444 12/05/24  0509    135 140   K 4.0 4.6 4.4   CL 99 100 101   CO2 28 26 29   BUN 19 19 17   CREATININE 0.8 0.7 0.7     LIVER PROFILE:   Recent Labs     12/03/24 0425 12/04/24 0444 12/05/24  0509   AST 17 13 16   ALT 27 23 26   BILITOT 0.3 0.3 0.4   ALKPHOS 42 40 34*       PT/INR: No results for input(s): \"PROTIME\", \"INR\" in the last 72 hours.  APTT: No results for input(s): \"APTT\" in the last 72 hours.      Pathology:  N/A      Microbiology:  Heavy growth Moraxella    Recent ABG:   No results for input(s): \"PH\", \"PO2\", \"PCO2\", \"HCO3\", \"BE\", \"O2SAT\", \"METHB\", \"O2HB\", \"COHB\", \"O2CON\", \"HHB\", \"THB\" in the last 72 hours.          Recent Films:  CTA PULMONARY W CONTRAST   Final Result   Addendum (preliminary) 1 of 1   ADDENDUM:   Findings discussed with SERGEI Wang on 11/24/2024 at 5:30 p.m..         Final   No evidence of pulmonary embolism or acute pulmonary abnormality.            CT CHEST W CONTRAST   Final Result   Addendum (preliminary) 1 of 1   ADDENDUM:   Possible very small focus of thrombus in the pulmonary artery branch    vessels   to both lower lobes.  There is suboptimal opacification of the pulmonary   arteries.  Recommend repeat study.  Findings discussed with Blaine harper    at   3:44 p.m. on 11/24/2024.         Final   No evidence for pneumonia.  The lungs are clear.  No pleural effusions.            XR CHEST PORTABLE   Final Result   No acute cardiopulmonary disease.             Assessment:  Acute hypoxic respiratory failure with bronchospasm likely associated to COPD.   Mucous plugging: Failure to clear secretions suggests  tracheomalacia.       Plan:  Prednisone taper. Reconciled.  Will need 7 days of antibiotics: Augmentin  Continue aerosols. DME for nebulizer for home to treat COPD. Reconciled.   Check ambulatory pulse oximetry.   PFT outpatient.   Okay for discharge with follow up in 2 weeks in office.     Time at the bedside, reviewing labs and radiographs, reviewing updated notes and consultations, discussing with staff and family was more than 50 minutes.    Please note that voice recognition technology was used in the preparation of this note and make therefore it may contain inadvertent transcription errors.  If the patient is a COVID 19 isolation patient, the above physical exam reflects that of the examining physician for the day.        ROCIO Centeno-SERGEI  Attestation    The patient was seen and personally examined.  History is obtained through the patient by myself.  Impression and plan are mine.    Augmentin 875 bid x 7 d    Documentation only provided per the nurse practitioner.    Additions and corrections are reflected in the signed note.    Simone Rose MD,  M.D., F.C.C.P.    [facility]    [facility], [address] [place] [address], [address], [place] [address]  Office visits:  [address] [place] [address]

## 2024-12-05 NOTE — DISCHARGE SUMMARY
[facility]   Discharge summary   Patient ID:  Tiffany Valentine  [id number]  64 y.o.  1960    Admit date: 11/24/2024    Discharge date and time: 12/5/2024    Admission Diagnoses:   Patient Active Problem List   Diagnosis    Fibromyalgia    Anxiety    Chronic prescription opiate use    Smoker    Primary insomnia    Chronic pain syndrome [G89.4 (ICD-10-CM)]    Cervicalgia    Chronic bilateral low back pain without sciatica    COPD exacerbation (HCC)    Mucus plugging of bronchi       Discharge Diagnoses: COPD exacerbation    Consults: pulmonary/intensive care    Procedures: Bronchoscopy    Hospital Course: The patient is a 64 y.o. female of Camryn Arriaga PA-C       Patient is a 64-year-old female admitted to [facility] for  COPD exacerbation.  -Monitor labs  -proBNP 83  -Respiratory panel negative  -D-dimer 586  -Increase IV Solu-Medrol 40 mg every 6 hours-  -Consult pulmonology, she needs to be established with a lung doctor as she has longstanding COPD and coarse breath sounds, impending respiratory failure acutely  -Scheduled breathing treatments  -She stopped smoking or slow down she tells me about 2 weeks ago-does not wear oxygen at home  -Check ambulatory pulse ox once breathing status is improved  -Continue scheduled breathing treatments  -Currently utilizing 4 L nasal cannula satting 98%, wean supplemental O2 as able     11/26/2024  -Monitor labs  -Heart rate and blood pressure stable  -Remains on 4 L nasal cannula satting 94%  -Norco added for pain as needed  -88% on 4 L during walking pulse ox   -Continue 60 mg IV solu-medrol q6h per pulmonary   -Continue scheduled breathing treatments  -Continue EZ Pap and flutter valve  -Will require PFTs outpatient  -Continue to monitor respiratory status     11/27/2024  -Monitor labs  -Heart rate and blood pressure stable  -Remains on 4 L nasal cannula satting 94%  -Continue 60 mg IV solu-medrol q6h per pulmonary   -Continue scheduled breathing

## 2024-12-10 NOTE — OP NOTE
Operative Note      Patient: Tiffany Valentine  YOB: 1960  MRN: [id number]    Date of Procedure: 12/3/2024    Pre-Op Diagnosis Codes:      * Mucus plugging of bronchi [T17.500A]    Post-Op Diagnosis: Same       Procedure(s):  BRONCHOSCOPY DIAGNOSTIC OR CELL WASH ONLY    Surgeon(s):  Simone Rose MD    Assistant:   * No surgical staff found *    Anesthesia: Monitor Anesthesia Care    Estimated Blood Loss (mL): Minimal    Complications: None    Specimens:   ID Type Source Tests Collected by Time Destination   1 : bilateral lung cell wash Respiratory Bronchial Washing GRAM STAIN (Canceled), CULTURE, RESPIRATORY Simone Rose MD 12/3/2024 1320        Implants:  * No implants in log *      Drains: * No LDAs found *    Findings:  Infection Present At Time Of Surgery (PATOS) (choose all levels that have infection present):  No infection present  Other Findings: Bibasilar mucous plugging without evidence of endobronchial lesion    Detailed Description of Procedure:   The patient was informed of the procedure, consent was obtained.  A fiberoptic bronchoscope was passed through the oropharynx.  The vocal cords move promptly to the midline.  The trachea was then intubated and the right and left lungs were inspected.  Purulent secretions were noted throughout the tracheobronchial tree.  These were aggressively suctioned and sent for culture and sensitivity.  No endobronchial lesions were seen otherwise.    The patient tolerated the procedure well and was sent to recovery in stable condition.    Electronically signed by Simone Rose MD on 12/10/2024 at 2:38 PM

## (undated) DEVICE — Device

## (undated) DEVICE — SOLUTION IRRIG 500ML 0.9% SOD CHLO USP POUR PLAS BTL

## (undated) DEVICE — PAD SENSOR ALRM 13X13 IN THN PROF WIDE PRT MDT85 CHAIR WHT

## (undated) DEVICE — 1870+ HEALTH CARE PART RESP. 120/CASE: Brand: AURA™